# Patient Record
Sex: FEMALE | Race: WHITE | URBAN - METROPOLITAN AREA
[De-identification: names, ages, dates, MRNs, and addresses within clinical notes are randomized per-mention and may not be internally consistent; named-entity substitution may affect disease eponyms.]

---

## 2017-05-12 ENCOUNTER — HISTORICAL (OUTPATIENT)
Dept: OTOLARYNGOLOGY | Facility: CLINIC | Age: 62
End: 2017-05-12

## 2017-05-12 LAB
ALBUMIN SERPL-MCNC: 3.7 GM/DL (ref 3.4–5)
ALBUMIN/GLOB SERPL: 1 RATIO (ref 1–2)
ALP SERPL-CCNC: 36 UNIT/L (ref 20–120)
ALT SERPL-CCNC: 8 UNIT/L
AST SERPL-CCNC: 21 UNIT/L
BILIRUB SERPL-MCNC: 1.1 MG/DL
BILIRUBIN DIRECT+TOT PNL SERPL-MCNC: 0.2 MG/DL
BILIRUBIN DIRECT+TOT PNL SERPL-MCNC: 0.9 MG/DL
BUN SERPL-MCNC: 6 MG/DL (ref 7–25)
BUN SERPL-MCNC: 7 MG/DL (ref 7–25)
CALCIUM SERPL-MCNC: 9 MG/DL (ref 8.4–10.3)
CHLORIDE SERPL-SCNC: 97 MMOL/L (ref 96–110)
CO2 SERPL-SCNC: 31 MMOL/L (ref 24–32)
CREAT SERPL-MCNC: 0.78 MG/DL (ref 0.7–1.1)
CREAT SERPL-MCNC: 0.82 MG/DL (ref 0.7–1.1)
ERYTHROCYTE [DISTWIDTH] IN BLOOD BY AUTOMATED COUNT: 13.3 % (ref 11.5–14.5)
GLOBULIN SER-MCNC: 3 GM/ML (ref 2.3–3.5)
GLUCOSE SERPL-MCNC: 75 MG/DL (ref 65–99)
HCT VFR BLD AUTO: 36.8 % (ref 35–46)
HGB BLD-MCNC: 12.4 GM/DL (ref 12–16)
MCH RBC QN AUTO: 35.4 PG (ref 26–34)
MCHC RBC AUTO-ENTMCNC: 33.7 GM/DL (ref 31–37)
MCV RBC AUTO: 105.1 FL (ref 80–100)
PLATELET # BLD AUTO: 296 X10(3)/MCL (ref 130–400)
PMV BLD AUTO: 10.1 FL (ref 7.4–10.4)
POTASSIUM SERPL-SCNC: 3.2 MMOL/L (ref 3.6–5.2)
PROT SERPL-MCNC: 6.7 GM/DL (ref 6–8)
RBC # BLD AUTO: 3.5 X10(6)/MCL (ref 4–5.2)
SODIUM SERPL-SCNC: 139 MMOL/L (ref 135–146)
WBC # SPEC AUTO: 8.9 X10(3)/MCL (ref 4.5–11)

## 2017-05-15 ENCOUNTER — HISTORICAL (OUTPATIENT)
Dept: SURGERY | Facility: HOSPITAL | Age: 62
End: 2017-05-15

## 2017-05-15 LAB
ETHANOL SERPL-MCNC: <5 MG/DL
POTASSIUM SERPL-SCNC: 3.1 MMOL/L (ref 3.6–5.2)

## 2017-05-30 ENCOUNTER — HISTORICAL (OUTPATIENT)
Dept: ADMINISTRATIVE | Facility: HOSPITAL | Age: 62
End: 2017-05-30

## 2017-05-30 LAB
ALBUMIN SERPL-MCNC: 3.8 GM/DL (ref 3.4–5)
ALBUMIN/GLOB SERPL: 1 RATIO (ref 1–2)
ALP SERPL-CCNC: 56 UNIT/L (ref 20–120)
ALT SERPL-CCNC: 11 UNIT/L
AST SERPL-CCNC: 21 UNIT/L
BILIRUB SERPL-MCNC: 0.6 MG/DL
BILIRUBIN DIRECT+TOT PNL SERPL-MCNC: <0.1 MG/DL
BILIRUBIN DIRECT+TOT PNL SERPL-MCNC: >0.5 MG/DL
BUN SERPL-MCNC: 8 MG/DL (ref 7–25)
CALCIUM SERPL-MCNC: 9.8 MG/DL (ref 8.4–10.3)
CHLORIDE SERPL-SCNC: 98 MMOL/L (ref 96–110)
CO2 SERPL-SCNC: 32 MMOL/L (ref 24–32)
CREAT SERPL-MCNC: 0.73 MG/DL (ref 0.7–1.1)
ERYTHROCYTE [DISTWIDTH] IN BLOOD BY AUTOMATED COUNT: 12.5 % (ref 11.5–14.5)
GLOBULIN SER-MCNC: 3.5 GM/ML (ref 2.3–3.5)
GLUCOSE SERPL-MCNC: 104 MG/DL (ref 65–99)
HCT VFR BLD AUTO: 38.6 % (ref 35–46)
HGB BLD-MCNC: 12.9 GM/DL (ref 12–16)
MCH RBC QN AUTO: 34.3 PG (ref 26–34)
MCHC RBC AUTO-ENTMCNC: 33.4 GM/DL (ref 31–37)
MCV RBC AUTO: 102.7 FL (ref 80–100)
PLATELET # BLD AUTO: 350 X10(3)/MCL (ref 130–400)
PMV BLD AUTO: 9.9 FL (ref 7.4–10.4)
POTASSIUM SERPL-SCNC: 3 MMOL/L (ref 3.6–5.2)
PROT SERPL-MCNC: 7.3 GM/DL (ref 6–8)
RBC # BLD AUTO: 3.76 X10(6)/MCL (ref 4–5.2)
SODIUM SERPL-SCNC: 139 MMOL/L (ref 135–146)
WBC # SPEC AUTO: 9.7 X10(3)/MCL (ref 4.5–11)

## 2017-05-31 ENCOUNTER — HISTORICAL (OUTPATIENT)
Dept: RADIATION THERAPY | Facility: HOSPITAL | Age: 62
End: 2017-05-31

## 2017-06-27 ENCOUNTER — HISTORICAL (OUTPATIENT)
Dept: RADIATION THERAPY | Facility: HOSPITAL | Age: 62
End: 2017-06-27

## 2017-06-28 ENCOUNTER — HISTORICAL (OUTPATIENT)
Dept: RADIATION THERAPY | Facility: HOSPITAL | Age: 62
End: 2017-06-28

## 2017-07-05 ENCOUNTER — HISTORICAL (OUTPATIENT)
Dept: SPEECH THERAPY | Facility: HOSPITAL | Age: 62
End: 2017-07-05

## 2017-07-06 ENCOUNTER — HISTORICAL (OUTPATIENT)
Dept: RADIATION THERAPY | Facility: HOSPITAL | Age: 62
End: 2017-07-06

## 2017-07-07 ENCOUNTER — HISTORICAL (OUTPATIENT)
Dept: RADIATION THERAPY | Facility: HOSPITAL | Age: 62
End: 2017-07-07

## 2017-07-10 ENCOUNTER — HISTORICAL (OUTPATIENT)
Dept: INFUSION THERAPY | Facility: HOSPITAL | Age: 62
End: 2017-07-10

## 2017-07-10 LAB
ABS NEUT (OLG): 4.88 X10(3)/MCL (ref 2.1–9.2)
ALBUMIN SERPL-MCNC: 3.4 GM/DL (ref 3.4–5)
ALBUMIN/GLOB SERPL: 1 RATIO (ref 1–2)
ALP SERPL-CCNC: 85 UNIT/L (ref 45–117)
ALT SERPL-CCNC: 19 UNIT/L (ref 12–78)
AST SERPL-CCNC: 20 UNIT/L (ref 15–37)
BASOPHILS # BLD AUTO: 0.06 X10(3)/MCL
BASOPHILS NFR BLD AUTO: 1 % (ref 0–1)
BILIRUB SERPL-MCNC: 0.2 MG/DL (ref 0.2–1)
BILIRUBIN DIRECT+TOT PNL SERPL-MCNC: <0.1 MG/DL
BILIRUBIN DIRECT+TOT PNL SERPL-MCNC: >0.1 MG/DL
BUN SERPL-MCNC: 9 MG/DL (ref 7–18)
CALCIUM SERPL-MCNC: 9 MG/DL (ref 8.5–10.1)
CHLORIDE SERPL-SCNC: 102 MMOL/L (ref 98–107)
CO2 SERPL-SCNC: 32 MMOL/L (ref 21–32)
CREAT SERPL-MCNC: 0.6 MG/DL (ref 0.6–1.3)
EOSINOPHIL # BLD AUTO: 0.24 X10(3)/MCL
EOSINOPHIL NFR BLD AUTO: 3 % (ref 0–5)
ERYTHROCYTE [DISTWIDTH] IN BLOOD BY AUTOMATED COUNT: 12.5 % (ref 11.5–14.5)
GLOBULIN SER-MCNC: 3.8 GM/ML (ref 2.3–3.5)
GLUCOSE SERPL-MCNC: 84 MG/DL (ref 74–106)
HCT VFR BLD AUTO: 27.9 % (ref 35–46)
HGB BLD-MCNC: 8.9 GM/DL (ref 12–16)
IMM GRANULOCYTES # BLD AUTO: 0.05 10*3/UL
IMM GRANULOCYTES NFR BLD AUTO: 1 %
LYMPHOCYTES # BLD AUTO: 1.36 X10(3)/MCL
LYMPHOCYTES NFR BLD AUTO: 18 % (ref 15–40)
MCH RBC QN AUTO: 31.8 PG (ref 26–34)
MCHC RBC AUTO-ENTMCNC: 31.9 GM/DL (ref 31–37)
MCV RBC AUTO: 99.6 FL (ref 80–100)
MONOCYTES # BLD AUTO: 0.92 X10(3)/MCL
MONOCYTES NFR BLD AUTO: 12 % (ref 4–12)
NEUTROPHILS # BLD AUTO: 4.88 X10(3)/MCL
NEUTROPHILS NFR BLD AUTO: 65 X10(3)/MCL
PLATELET # BLD AUTO: 460 X10(3)/MCL (ref 130–400)
PMV BLD AUTO: 9.3 FL (ref 7.4–10.4)
POTASSIUM SERPL-SCNC: 4.3 MMOL/L (ref 3.5–5.1)
PROT SERPL-MCNC: 7.2 GM/DL (ref 6.4–8.2)
RBC # BLD AUTO: 2.8 X10(6)/MCL (ref 4–5.2)
SODIUM SERPL-SCNC: 142 MMOL/L (ref 136–145)
WBC # SPEC AUTO: 7.5 X10(3)/MCL (ref 4.5–11)

## 2017-07-11 ENCOUNTER — HISTORICAL (OUTPATIENT)
Dept: RADIATION THERAPY | Facility: HOSPITAL | Age: 62
End: 2017-07-11

## 2017-07-12 ENCOUNTER — HISTORICAL (OUTPATIENT)
Dept: INFUSION THERAPY | Facility: HOSPITAL | Age: 62
End: 2017-07-12

## 2017-07-12 ENCOUNTER — HISTORICAL (OUTPATIENT)
Dept: RADIATION THERAPY | Facility: HOSPITAL | Age: 62
End: 2017-07-12

## 2017-07-13 ENCOUNTER — HISTORICAL (OUTPATIENT)
Dept: RADIATION THERAPY | Facility: HOSPITAL | Age: 62
End: 2017-07-13

## 2017-07-14 ENCOUNTER — HISTORICAL (OUTPATIENT)
Dept: RADIATION THERAPY | Facility: HOSPITAL | Age: 62
End: 2017-07-14

## 2017-07-17 ENCOUNTER — HISTORICAL (OUTPATIENT)
Dept: RADIATION THERAPY | Facility: HOSPITAL | Age: 62
End: 2017-07-17

## 2017-07-18 ENCOUNTER — HISTORICAL (OUTPATIENT)
Dept: RADIATION THERAPY | Facility: HOSPITAL | Age: 62
End: 2017-07-18

## 2017-07-19 ENCOUNTER — HISTORICAL (OUTPATIENT)
Dept: ADMINISTRATIVE | Facility: HOSPITAL | Age: 62
End: 2017-07-19

## 2017-07-19 ENCOUNTER — HISTORICAL (OUTPATIENT)
Dept: RADIATION THERAPY | Facility: HOSPITAL | Age: 62
End: 2017-07-19

## 2017-07-19 LAB
ABS NEUT (OLG): 3.53 X10(3)/MCL (ref 2.1–9.2)
ALBUMIN SERPL-MCNC: 3.6 GM/DL (ref 3.4–5)
ALBUMIN/GLOB SERPL: 1 RATIO (ref 1–2)
ALP SERPL-CCNC: 84 UNIT/L (ref 45–117)
ALT SERPL-CCNC: 41 UNIT/L (ref 12–78)
AST SERPL-CCNC: 32 UNIT/L (ref 15–37)
BASOPHILS # BLD AUTO: 0.04 X10(3)/MCL
BASOPHILS NFR BLD AUTO: 1 % (ref 0–1)
BILIRUB SERPL-MCNC: 0.3 MG/DL (ref 0.2–1)
BILIRUBIN DIRECT+TOT PNL SERPL-MCNC: <0.1 MG/DL
BILIRUBIN DIRECT+TOT PNL SERPL-MCNC: >0.2 MG/DL
BUN SERPL-MCNC: 19 MG/DL (ref 7–18)
CALCIUM SERPL-MCNC: 9.2 MG/DL (ref 8.5–10.1)
CHLORIDE SERPL-SCNC: 95 MMOL/L (ref 98–107)
CO2 SERPL-SCNC: 33 MMOL/L (ref 21–32)
CREAT SERPL-MCNC: 1.2 MG/DL (ref 0.6–1.3)
EOSINOPHIL # BLD AUTO: 0.09 X10(3)/MCL
EOSINOPHIL NFR BLD AUTO: 2 % (ref 0–5)
ERYTHROCYTE [DISTWIDTH] IN BLOOD BY AUTOMATED COUNT: 12.6 % (ref 11.5–14.5)
GLOBULIN SER-MCNC: 3.7 GM/ML (ref 2.3–3.5)
GLUCOSE SERPL-MCNC: 69 MG/DL (ref 74–106)
HCT VFR BLD AUTO: 25.9 % (ref 35–46)
HGB BLD-MCNC: 8.6 GM/DL (ref 12–16)
IMM GRANULOCYTES # BLD AUTO: 0.01 10*3/UL
IMM GRANULOCYTES NFR BLD AUTO: 0 %
LYMPHOCYTES # BLD AUTO: 0.56 X10(3)/MCL
LYMPHOCYTES NFR BLD AUTO: 12 % (ref 15–40)
MAGNESIUM SERPL-MCNC: 2 MG/DL (ref 1.8–2.4)
MCH RBC QN AUTO: 31 PG (ref 26–34)
MCHC RBC AUTO-ENTMCNC: 33.2 GM/DL (ref 31–37)
MCV RBC AUTO: 93.5 FL (ref 80–100)
MONOCYTES # BLD AUTO: 0.64 X10(3)/MCL
MONOCYTES NFR BLD AUTO: 13 % (ref 4–12)
NEUTROPHILS # BLD AUTO: 3.53 X10(3)/MCL
NEUTROPHILS NFR BLD AUTO: 73 X10(3)/MCL
PLATELET # BLD AUTO: 361 X10(3)/MCL (ref 130–400)
PMV BLD AUTO: 9.4 FL (ref 7.4–10.4)
POTASSIUM SERPL-SCNC: 4.2 MMOL/L (ref 3.5–5.1)
PROT SERPL-MCNC: 7.3 GM/DL (ref 6.4–8.2)
RBC # BLD AUTO: 2.77 X10(6)/MCL (ref 4–5.2)
SODIUM SERPL-SCNC: 135 MMOL/L (ref 136–145)
WBC # SPEC AUTO: 4.9 X10(3)/MCL (ref 4.5–11)

## 2017-07-20 ENCOUNTER — HISTORICAL (OUTPATIENT)
Dept: RADIATION THERAPY | Facility: HOSPITAL | Age: 62
End: 2017-07-20

## 2017-07-21 ENCOUNTER — HISTORICAL (OUTPATIENT)
Dept: RADIATION THERAPY | Facility: HOSPITAL | Age: 62
End: 2017-07-21

## 2017-07-24 ENCOUNTER — HISTORICAL (OUTPATIENT)
Dept: RADIATION THERAPY | Facility: HOSPITAL | Age: 62
End: 2017-07-24

## 2017-07-25 ENCOUNTER — HISTORICAL (OUTPATIENT)
Dept: RADIATION THERAPY | Facility: HOSPITAL | Age: 62
End: 2017-07-25

## 2017-07-26 ENCOUNTER — HISTORICAL (OUTPATIENT)
Dept: RADIATION THERAPY | Facility: HOSPITAL | Age: 62
End: 2017-07-26

## 2017-07-27 ENCOUNTER — HISTORICAL (OUTPATIENT)
Dept: RADIATION THERAPY | Facility: HOSPITAL | Age: 62
End: 2017-07-27

## 2017-07-28 ENCOUNTER — HISTORICAL (OUTPATIENT)
Dept: RADIATION THERAPY | Facility: HOSPITAL | Age: 62
End: 2017-07-28

## 2017-07-31 ENCOUNTER — HISTORICAL (OUTPATIENT)
Dept: RADIATION THERAPY | Facility: HOSPITAL | Age: 62
End: 2017-07-31

## 2017-08-01 ENCOUNTER — HISTORICAL (OUTPATIENT)
Dept: RADIATION THERAPY | Facility: HOSPITAL | Age: 62
End: 2017-08-01

## 2017-08-02 ENCOUNTER — HISTORICAL (OUTPATIENT)
Dept: RADIATION THERAPY | Facility: HOSPITAL | Age: 62
End: 2017-08-02

## 2017-08-02 ENCOUNTER — HISTORICAL (OUTPATIENT)
Dept: INFUSION THERAPY | Facility: HOSPITAL | Age: 62
End: 2017-08-02

## 2017-08-02 LAB
ABS NEUT (OLG): 1.56 X10(3)/MCL
ALBUMIN SERPL-MCNC: 3.5 GM/DL (ref 3.4–5)
ALBUMIN/GLOB SERPL: 1 RATIO (ref 1–2)
ALP SERPL-CCNC: 96 UNIT/L (ref 45–117)
ALT SERPL-CCNC: 29 UNIT/L (ref 12–78)
ANISOCYTOSIS BLD QL SMEAR: ABNORMAL
AST SERPL-CCNC: 31 UNIT/L (ref 15–37)
BASOPHILS NFR BLD MANUAL: 0 %
BILIRUB SERPL-MCNC: 0.2 MG/DL (ref 0.2–1)
BILIRUBIN DIRECT+TOT PNL SERPL-MCNC: <0.1 MG/DL
BILIRUBIN DIRECT+TOT PNL SERPL-MCNC: >0.1 MG/DL
BUN SERPL-MCNC: 12 MG/DL (ref 7–18)
CALCIUM SERPL-MCNC: 9.3 MG/DL (ref 8.5–10.1)
CHLORIDE SERPL-SCNC: 102 MMOL/L (ref 98–107)
CO2 SERPL-SCNC: 31 MMOL/L (ref 21–32)
CREAT SERPL-MCNC: 0.9 MG/DL (ref 0.6–1.3)
EOSINOPHIL NFR BLD MANUAL: 2 %
ERYTHROCYTE [DISTWIDTH] IN BLOOD BY AUTOMATED COUNT: 14.1 % (ref 11.5–14.5)
GLOBULIN SER-MCNC: 4.1 GM/ML (ref 2.3–3.5)
GLUCOSE SERPL-MCNC: 87 MG/DL (ref 74–106)
GRANULOCYTES NFR BLD MANUAL: 47 % (ref 43–75)
HCT VFR BLD AUTO: 29.2 % (ref 35–46)
HGB BLD-MCNC: 9.3 GM/DL (ref 12–16)
HYPOCHROMIA BLD QL SMEAR: ABNORMAL
LYMPHOCYTES NFR BLD MANUAL: 17 % (ref 20.5–51.1)
MAGNESIUM SERPL-MCNC: 2.1 MG/DL (ref 1.8–2.4)
MCH RBC QN AUTO: 30.5 PG (ref 26–34)
MCHC RBC AUTO-ENTMCNC: 31.8 GM/DL (ref 31–37)
MCV RBC AUTO: 95.7 FL (ref 80–100)
MONOCYTES NFR BLD MANUAL: 11 % (ref 2–9)
NEUTS BAND NFR BLD MANUAL: 23 % (ref 0–10)
PLATELET # BLD AUTO: 588 X10(3)/MCL (ref 130–400)
PLATELET # BLD EST: ABNORMAL 10*3/UL
PMV BLD AUTO: 9 FL (ref 7.4–10.4)
POTASSIUM SERPL-SCNC: 4.7 MMOL/L (ref 3.5–5.1)
PROT SERPL-MCNC: 7.6 GM/DL (ref 6.4–8.2)
RBC # BLD AUTO: 3.05 X10(6)/MCL (ref 4–5.2)
RBC MORPH BLD: ABNORMAL
SODIUM SERPL-SCNC: 139 MMOL/L (ref 136–145)
WBC # SPEC AUTO: 3.2 X10(3)/MCL (ref 4.5–11)

## 2017-08-03 ENCOUNTER — HISTORICAL (OUTPATIENT)
Dept: RADIATION THERAPY | Facility: HOSPITAL | Age: 62
End: 2017-08-03

## 2017-08-04 ENCOUNTER — HISTORICAL (OUTPATIENT)
Dept: RADIATION THERAPY | Facility: HOSPITAL | Age: 62
End: 2017-08-04

## 2017-08-07 ENCOUNTER — HISTORICAL (OUTPATIENT)
Dept: RADIATION THERAPY | Facility: HOSPITAL | Age: 62
End: 2017-08-07

## 2017-08-08 ENCOUNTER — HISTORICAL (OUTPATIENT)
Dept: RADIATION THERAPY | Facility: HOSPITAL | Age: 62
End: 2017-08-08

## 2017-08-08 ENCOUNTER — HISTORICAL (OUTPATIENT)
Dept: SPEECH THERAPY | Facility: HOSPITAL | Age: 62
End: 2017-08-08

## 2017-08-08 ENCOUNTER — HISTORICAL (OUTPATIENT)
Dept: ADMINISTRATIVE | Facility: HOSPITAL | Age: 62
End: 2017-08-08

## 2017-08-08 LAB
ABS NEUT (OLG): 5.25 X10(3)/MCL (ref 2.1–9.2)
ALBUMIN SERPL-MCNC: 3.6 GM/DL (ref 3.4–5)
ALBUMIN/GLOB SERPL: 1 RATIO (ref 1–2)
ALP SERPL-CCNC: 81 UNIT/L (ref 45–117)
ALT SERPL-CCNC: 45 UNIT/L (ref 12–78)
APPEARANCE, UA: CLEAR
AST SERPL-CCNC: 27 UNIT/L (ref 15–37)
BACTERIA #/AREA URNS AUTO: ABNORMAL /[HPF]
BASOPHILS # BLD AUTO: 0.07 X10(3)/MCL
BASOPHILS NFR BLD AUTO: 1 % (ref 0–1)
BILIRUB SERPL-MCNC: 0.4 MG/DL (ref 0.2–1)
BILIRUB UR QL STRIP: NEGATIVE
BILIRUBIN DIRECT+TOT PNL SERPL-MCNC: <0.1 MG/DL
BILIRUBIN DIRECT+TOT PNL SERPL-MCNC: >0.3 MG/DL
BUN SERPL-MCNC: 13 MG/DL (ref 7–18)
CALCIUM SERPL-MCNC: 9.1 MG/DL (ref 8.5–10.1)
CHLORIDE SERPL-SCNC: 99 MMOL/L (ref 98–107)
CO2 SERPL-SCNC: 29 MMOL/L (ref 21–32)
COLOR UR: YELLOW
CREAT SERPL-MCNC: 0.9 MG/DL (ref 0.6–1.3)
EOSINOPHIL # BLD AUTO: 0.07 X10(3)/MCL
EOSINOPHIL NFR BLD AUTO: 1 % (ref 0–5)
ERYTHROCYTE [DISTWIDTH] IN BLOOD BY AUTOMATED COUNT: 13.8 % (ref 11.5–14.5)
GLOBULIN SER-MCNC: 4.1 GM/ML (ref 2.3–3.5)
GLUCOSE (UA): NORMAL
GLUCOSE SERPL-MCNC: 95 MG/DL (ref 74–106)
HCT VFR BLD AUTO: 30.1 % (ref 35–46)
HGB BLD-MCNC: 10 GM/DL (ref 12–16)
HGB UR QL STRIP: NEGATIVE
HYALINE CASTS #/AREA URNS LPF: ABNORMAL /[LPF]
IMM GRANULOCYTES # BLD AUTO: 0.04 10*3/UL
IMM GRANULOCYTES NFR BLD AUTO: 1 %
KETONES UR QL STRIP: ABNORMAL
LEUKOCYTE ESTERASE UR QL STRIP: NEGATIVE
LYMPHOCYTES # BLD AUTO: 0.58 X10(3)/MCL
LYMPHOCYTES NFR BLD AUTO: 8 % (ref 15–40)
MAGNESIUM SERPL-MCNC: 1.9 MG/DL (ref 1.8–2.4)
MCH RBC QN AUTO: 30.7 PG (ref 26–34)
MCHC RBC AUTO-ENTMCNC: 33.2 GM/DL (ref 31–37)
MCV RBC AUTO: 92.3 FL (ref 80–100)
MONOCYTES # BLD AUTO: 0.91 X10(3)/MCL
MONOCYTES NFR BLD AUTO: 13 % (ref 4–12)
NEUTROPHILS # BLD AUTO: 5.25 X10(3)/MCL
NEUTROPHILS NFR BLD AUTO: 76 X10(3)/MCL
NITRITE UR QL STRIP: NEGATIVE
PH UR STRIP: 7.5 [PH] (ref 4.5–8)
PLATELET # BLD AUTO: 338 X10(3)/MCL (ref 130–400)
PMV BLD AUTO: 9.6 FL (ref 7.4–10.4)
POTASSIUM SERPL-SCNC: 4 MMOL/L (ref 3.5–5.1)
PROT SERPL-MCNC: 7.7 GM/DL (ref 6.4–8.2)
PROT UR QL STRIP: 30 MG/DL
RBC # BLD AUTO: 3.26 X10(6)/MCL (ref 4–5.2)
RBC #/AREA URNS AUTO: ABNORMAL /[HPF]
SODIUM SERPL-SCNC: 135 MMOL/L (ref 136–145)
SP GR UR STRIP: 1.01 (ref 1–1.03)
SQUAMOUS #/AREA URNS LPF: ABNORMAL /[LPF]
UROBILINOGEN UR STRIP-ACNC: NORMAL
WBC # SPEC AUTO: 6.9 X10(3)/MCL (ref 4.5–11)
WBC #/AREA URNS AUTO: ABNORMAL /HPF

## 2017-08-09 ENCOUNTER — HISTORICAL (OUTPATIENT)
Dept: RADIATION THERAPY | Facility: HOSPITAL | Age: 62
End: 2017-08-09

## 2017-08-10 ENCOUNTER — HISTORICAL (OUTPATIENT)
Dept: RADIATION THERAPY | Facility: HOSPITAL | Age: 62
End: 2017-08-10

## 2017-08-10 LAB — FINAL CULTURE: NORMAL

## 2017-08-11 ENCOUNTER — HISTORICAL (OUTPATIENT)
Dept: RADIATION THERAPY | Facility: HOSPITAL | Age: 62
End: 2017-08-11

## 2017-08-14 ENCOUNTER — HISTORICAL (OUTPATIENT)
Dept: RADIATION THERAPY | Facility: HOSPITAL | Age: 62
End: 2017-08-14

## 2017-08-15 ENCOUNTER — HISTORICAL (OUTPATIENT)
Dept: RADIATION THERAPY | Facility: HOSPITAL | Age: 62
End: 2017-08-15

## 2017-08-16 ENCOUNTER — HISTORICAL (OUTPATIENT)
Dept: RADIATION THERAPY | Facility: HOSPITAL | Age: 62
End: 2017-08-16

## 2017-08-17 ENCOUNTER — HISTORICAL (OUTPATIENT)
Dept: RADIATION THERAPY | Facility: HOSPITAL | Age: 62
End: 2017-08-17

## 2017-08-17 ENCOUNTER — HISTORICAL (OUTPATIENT)
Dept: HEMATOLOGY/ONCOLOGY | Facility: CLINIC | Age: 62
End: 2017-08-17

## 2017-08-18 ENCOUNTER — HISTORICAL (OUTPATIENT)
Dept: RADIATION THERAPY | Facility: HOSPITAL | Age: 62
End: 2017-08-18

## 2017-08-21 ENCOUNTER — HISTORICAL (OUTPATIENT)
Dept: RADIATION THERAPY | Facility: HOSPITAL | Age: 62
End: 2017-08-21

## 2017-08-22 ENCOUNTER — HISTORICAL (OUTPATIENT)
Dept: ADMINISTRATIVE | Facility: HOSPITAL | Age: 62
End: 2017-08-22

## 2017-08-22 ENCOUNTER — HISTORICAL (OUTPATIENT)
Dept: RADIATION THERAPY | Facility: HOSPITAL | Age: 62
End: 2017-08-22

## 2017-08-22 LAB
ABS NEUT (OLG): 1.29 X10(3)/MCL
ALBUMIN SERPL-MCNC: 3.3 GM/DL (ref 3.4–5)
ALBUMIN/GLOB SERPL: 1 RATIO (ref 1–2)
ALP SERPL-CCNC: 78 UNIT/L (ref 45–117)
ALT SERPL-CCNC: 24 UNIT/L (ref 12–78)
AST SERPL-CCNC: 20 UNIT/L (ref 15–37)
BASOPHILS NFR BLD MANUAL: 0 %
BILIRUB SERPL-MCNC: 0.2 MG/DL (ref 0.2–1)
BILIRUBIN DIRECT+TOT PNL SERPL-MCNC: <0.1 MG/DL
BILIRUBIN DIRECT+TOT PNL SERPL-MCNC: >0.1 MG/DL
BUN SERPL-MCNC: 7 MG/DL (ref 7–18)
CALCIUM SERPL-MCNC: 9.5 MG/DL (ref 8.5–10.1)
CHLORIDE SERPL-SCNC: 96 MMOL/L (ref 98–107)
CO2 SERPL-SCNC: 29 MMOL/L (ref 21–32)
CREAT SERPL-MCNC: 0.8 MG/DL (ref 0.6–1.3)
EOSINOPHIL NFR BLD MANUAL: 2 %
ERYTHROCYTE [DISTWIDTH] IN BLOOD BY AUTOMATED COUNT: 14.2 % (ref 11.5–14.5)
GLOBULIN SER-MCNC: 4.7 GM/ML (ref 2.3–3.5)
GLUCOSE SERPL-MCNC: 96 MG/DL (ref 74–106)
GRANULOCYTES NFR BLD MANUAL: 32 % (ref 43–75)
HCT VFR BLD AUTO: 31.3 % (ref 35–46)
HGB BLD-MCNC: 10.3 GM/DL (ref 12–16)
LYMPHOCYTES NFR BLD MANUAL: 32 % (ref 20.5–51.1)
MAGNESIUM SERPL-MCNC: 2.2 MG/DL (ref 1.8–2.4)
MCH RBC QN AUTO: 29.8 PG (ref 26–34)
MCHC RBC AUTO-ENTMCNC: 32.9 GM/DL (ref 31–37)
MCV RBC AUTO: 90.5 FL (ref 80–100)
METAMYELOCYTES NFR BLD MANUAL: 1 %
MONOCYTES NFR BLD MANUAL: 28 % (ref 2–9)
NEUTS BAND NFR BLD MANUAL: 5 % (ref 0–10)
PLATELET # BLD AUTO: 380 X10(3)/MCL (ref 130–400)
PLATELET # BLD EST: ADEQUATE 10*3/UL
PMV BLD AUTO: 9.2 FL (ref 7.4–10.4)
POTASSIUM SERPL-SCNC: 3.5 MMOL/L (ref 3.5–5.1)
PROT SERPL-MCNC: 8 GM/DL (ref 6.4–8.2)
RBC # BLD AUTO: 3.46 X10(6)/MCL (ref 4–5.2)
RBC MORPH BLD: NORMAL
SODIUM SERPL-SCNC: 134 MMOL/L (ref 136–145)
WBC # SPEC AUTO: 2.9 X10(3)/MCL (ref 4.5–11)

## 2017-08-23 ENCOUNTER — HISTORICAL (OUTPATIENT)
Dept: INFUSION THERAPY | Facility: HOSPITAL | Age: 62
End: 2017-08-23

## 2017-08-29 ENCOUNTER — HISTORICAL (OUTPATIENT)
Dept: RADIATION THERAPY | Facility: HOSPITAL | Age: 62
End: 2017-08-29

## 2017-08-31 ENCOUNTER — HISTORICAL (OUTPATIENT)
Dept: INFUSION THERAPY | Facility: HOSPITAL | Age: 62
End: 2017-08-31

## 2017-09-12 ENCOUNTER — HISTORICAL (OUTPATIENT)
Dept: SPEECH THERAPY | Facility: HOSPITAL | Age: 62
End: 2017-09-12

## 2017-09-20 ENCOUNTER — HISTORICAL (OUTPATIENT)
Dept: RADIOLOGY | Facility: HOSPITAL | Age: 62
End: 2017-09-20

## 2017-09-22 ENCOUNTER — HISTORICAL (OUTPATIENT)
Dept: ADMINISTRATIVE | Facility: HOSPITAL | Age: 62
End: 2017-09-22

## 2017-09-22 LAB
ABS NEUT (OLG): 3.83 X10(3)/MCL (ref 2.1–9.2)
ALBUMIN SERPL-MCNC: 3.2 GM/DL (ref 3.4–5)
ALBUMIN/GLOB SERPL: 1 RATIO (ref 1–2)
ALP SERPL-CCNC: 142 UNIT/L (ref 45–117)
ALT SERPL-CCNC: 32 UNIT/L (ref 12–78)
AST SERPL-CCNC: 34 UNIT/L (ref 15–37)
BASOPHILS # BLD AUTO: 0.08 X10(3)/MCL
BASOPHILS NFR BLD AUTO: 2 % (ref 0–1)
BILIRUB SERPL-MCNC: 0.2 MG/DL (ref 0.2–1)
BILIRUBIN DIRECT+TOT PNL SERPL-MCNC: <0.1 MG/DL
BILIRUBIN DIRECT+TOT PNL SERPL-MCNC: >0.1 MG/DL
BUN SERPL-MCNC: 7 MG/DL (ref 7–18)
CALCIUM SERPL-MCNC: 9.2 MG/DL (ref 8.5–10.1)
CHLORIDE SERPL-SCNC: 100 MMOL/L (ref 98–107)
CO2 SERPL-SCNC: 30 MMOL/L (ref 21–32)
CREAT SERPL-MCNC: 0.9 MG/DL (ref 0.6–1.3)
EOSINOPHIL # BLD AUTO: 0.11 X10(3)/MCL
EOSINOPHIL NFR BLD AUTO: 2 % (ref 0–5)
ERYTHROCYTE [DISTWIDTH] IN BLOOD BY AUTOMATED COUNT: 15 % (ref 11.5–14.5)
GLOBULIN SER-MCNC: 4.8 GM/ML (ref 2.3–3.5)
GLUCOSE SERPL-MCNC: 88 MG/DL (ref 74–106)
HCT VFR BLD AUTO: 31.5 % (ref 35–46)
HGB BLD-MCNC: 10.3 GM/DL (ref 12–16)
IMM GRANULOCYTES # BLD AUTO: 0.01 10*3/UL
IMM GRANULOCYTES NFR BLD AUTO: 0 %
LYMPHOCYTES # BLD AUTO: 0.59 X10(3)/MCL
LYMPHOCYTES NFR BLD AUTO: 11 % (ref 15–40)
MCH RBC QN AUTO: 29.9 PG (ref 26–34)
MCHC RBC AUTO-ENTMCNC: 32.7 GM/DL (ref 31–37)
MCV RBC AUTO: 91.3 FL (ref 80–100)
MONOCYTES # BLD AUTO: 0.7 X10(3)/MCL
MONOCYTES NFR BLD AUTO: 13 % (ref 4–12)
NEUTROPHILS # BLD AUTO: 3.83 X10(3)/MCL
NEUTROPHILS NFR BLD AUTO: 72 X10(3)/MCL
PLATELET # BLD AUTO: 248 X10(3)/MCL (ref 130–400)
PMV BLD AUTO: 9.1 FL (ref 7.4–10.4)
POTASSIUM SERPL-SCNC: 4.3 MMOL/L (ref 3.5–5.1)
PROT SERPL-MCNC: 8 GM/DL (ref 6.4–8.2)
RBC # BLD AUTO: 3.45 X10(6)/MCL (ref 4–5.2)
SODIUM SERPL-SCNC: 136 MMOL/L (ref 136–145)
WBC # SPEC AUTO: 5.3 X10(3)/MCL (ref 4.5–11)

## 2017-10-10 ENCOUNTER — HISTORICAL (OUTPATIENT)
Dept: SPEECH THERAPY | Facility: HOSPITAL | Age: 62
End: 2017-10-10

## 2017-11-22 ENCOUNTER — HISTORICAL (OUTPATIENT)
Dept: RADIOLOGY | Facility: HOSPITAL | Age: 62
End: 2017-11-22

## 2017-11-27 ENCOUNTER — HISTORICAL (OUTPATIENT)
Dept: ADMINISTRATIVE | Facility: HOSPITAL | Age: 62
End: 2017-11-27

## 2017-11-27 LAB
ABS NEUT (OLG): 6.8 X10(3)/MCL (ref 2.1–9.2)
ALBUMIN SERPL-MCNC: 3.7 GM/DL (ref 3.4–5)
ALBUMIN/GLOB SERPL: 1 RATIO (ref 1–2)
ALP SERPL-CCNC: 67 UNIT/L (ref 45–117)
ALT SERPL-CCNC: 12 UNIT/L (ref 12–78)
AST SERPL-CCNC: 19 UNIT/L (ref 15–37)
BASOPHILS # BLD AUTO: 0.06 X10(3)/MCL
BASOPHILS NFR BLD AUTO: 1 % (ref 0–1)
BILIRUB SERPL-MCNC: 0.4 MG/DL (ref 0.2–1)
BILIRUBIN DIRECT+TOT PNL SERPL-MCNC: 0.1 MG/DL
BILIRUBIN DIRECT+TOT PNL SERPL-MCNC: 0.3 MG/DL
BUN SERPL-MCNC: 10 MG/DL (ref 7–18)
CALCIUM SERPL-MCNC: 8.6 MG/DL (ref 8.5–10.1)
CHLORIDE SERPL-SCNC: 97 MMOL/L (ref 98–107)
CO2 SERPL-SCNC: 30 MMOL/L (ref 21–32)
CREAT SERPL-MCNC: 0.9 MG/DL (ref 0.6–1.3)
EOSINOPHIL # BLD AUTO: 0.17 10*3/UL
EOSINOPHIL NFR BLD AUTO: 2 % (ref 0–5)
ERYTHROCYTE [DISTWIDTH] IN BLOOD BY AUTOMATED COUNT: 14.9 % (ref 11.5–14.5)
GLOBULIN SER-MCNC: 4.1 GM/ML (ref 2.3–3.5)
GLUCOSE SERPL-MCNC: 108 MG/DL (ref 74–106)
HAV IGM SERPL QL IA: NONREACTIVE
HBV CORE IGM SERPL QL IA: NONREACTIVE
HBV SURFACE AG SERPL QL IA: NEGATIVE
HCT VFR BLD AUTO: 22.6 % (ref 35–46)
HCV AB SERPL QL IA: NONREACTIVE
HGB BLD-MCNC: 7.3 GM/DL (ref 12–16)
HIV 1+2 AB+HIV1 P24 AG SERPL QL IA: NONREACTIVE
IMM GRANULOCYTES # BLD AUTO: 0.03 10*3/UL
IMM GRANULOCYTES NFR BLD AUTO: 0 %
LYMPHOCYTES # BLD AUTO: 0.58 X10(3)/MCL
LYMPHOCYTES NFR BLD AUTO: 7 % (ref 15–40)
MAGNESIUM SERPL-MCNC: 2 MG/DL (ref 1.8–2.4)
MCH RBC QN AUTO: 28.9 PG (ref 26–34)
MCHC RBC AUTO-ENTMCNC: 32.3 GM/DL (ref 31–37)
MCV RBC AUTO: 89.3 FL (ref 80–100)
MONOCYTES # BLD AUTO: 0.9 X10(3)/MCL
MONOCYTES NFR BLD AUTO: 10 % (ref 4–12)
NEUTROPHILS # BLD AUTO: 6.8 X10(3)/MCL
NEUTROPHILS NFR BLD AUTO: 80 X10(3)/MCL
PLATELET # BLD AUTO: 310 X10(3)/MCL (ref 130–400)
PMV BLD AUTO: 7.7 FL (ref 7.4–10.4)
POTASSIUM SERPL-SCNC: 4.8 MMOL/L (ref 3.5–5.1)
PROT SERPL-MCNC: 7.8 GM/DL (ref 6.4–8.2)
RBC # BLD AUTO: 2.53 X10(6)/MCL (ref 4–5.2)
SODIUM SERPL-SCNC: 133 MMOL/L (ref 136–145)
T4 FREE SERPL-MCNC: 1 NG/DL (ref 0.76–1.46)
TSH SERPL-ACNC: 4.6 MIU/L (ref 0.36–3.74)
WBC # SPEC AUTO: 8.5 X10(3)/MCL (ref 4.5–11)

## 2017-12-15 ENCOUNTER — HISTORICAL (OUTPATIENT)
Dept: ENDOSCOPY | Facility: HOSPITAL | Age: 62
End: 2017-12-15

## 2017-12-18 ENCOUNTER — HISTORICAL (OUTPATIENT)
Dept: ADMINISTRATIVE | Facility: HOSPITAL | Age: 62
End: 2017-12-18

## 2017-12-18 LAB
ABS NEUT (OLG): 3.82 X10(3)/MCL (ref 2.1–9.2)
ALBUMIN SERPL-MCNC: 3.5 GM/DL (ref 3.4–5)
ALBUMIN/GLOB SERPL: 1 RATIO (ref 1–2)
ALP SERPL-CCNC: 59 UNIT/L (ref 45–117)
ALT SERPL-CCNC: 14 UNIT/L (ref 12–78)
AST SERPL-CCNC: 18 UNIT/L (ref 15–37)
BASOPHILS # BLD AUTO: 0.07 X10(3)/MCL
BASOPHILS NFR BLD AUTO: 1 % (ref 0–1)
BILIRUB SERPL-MCNC: 0.2 MG/DL (ref 0.2–1)
BILIRUBIN DIRECT+TOT PNL SERPL-MCNC: 0.1 MG/DL
BILIRUBIN DIRECT+TOT PNL SERPL-MCNC: 0.1 MG/DL
BUN SERPL-MCNC: 6 MG/DL (ref 7–18)
CALCIUM SERPL-MCNC: 9.2 MG/DL (ref 8.5–10.1)
CHLORIDE SERPL-SCNC: 101 MMOL/L (ref 98–107)
CO2 SERPL-SCNC: 33 MMOL/L (ref 21–32)
CREAT SERPL-MCNC: 0.7 MG/DL (ref 0.6–1.3)
EOSINOPHIL # BLD AUTO: 0.14 10*3/UL
EOSINOPHIL NFR BLD AUTO: 3 % (ref 0–5)
ERYTHROCYTE [DISTWIDTH] IN BLOOD BY AUTOMATED COUNT: 15.8 % (ref 11.5–14.5)
GLOBULIN SER-MCNC: 4.5 GM/ML (ref 2.3–3.5)
GLUCOSE SERPL-MCNC: 84 MG/DL (ref 74–106)
HCT VFR BLD AUTO: 26.1 % (ref 35–46)
HGB BLD-MCNC: 8.2 GM/DL (ref 12–16)
IMM GRANULOCYTES # BLD AUTO: 0.03 10*3/UL
IMM GRANULOCYTES NFR BLD AUTO: 1 %
LYMPHOCYTES # BLD AUTO: 0.68 X10(3)/MCL
LYMPHOCYTES NFR BLD AUTO: 13 % (ref 15–40)
MCH RBC QN AUTO: 27.5 PG (ref 26–34)
MCHC RBC AUTO-ENTMCNC: 31.4 GM/DL (ref 31–37)
MCV RBC AUTO: 87.6 FL (ref 80–100)
MONOCYTES # BLD AUTO: 0.59 X10(3)/MCL
MONOCYTES NFR BLD AUTO: 11 % (ref 4–12)
NEUTROPHILS # BLD AUTO: 3.82 X10(3)/MCL
NEUTROPHILS NFR BLD AUTO: 72 X10(3)/MCL
PLATELET # BLD AUTO: 495 X10(3)/MCL (ref 130–400)
PMV BLD AUTO: 8.2 FL (ref 7.4–10.4)
POTASSIUM SERPL-SCNC: 3.4 MMOL/L (ref 3.5–5.1)
PROT SERPL-MCNC: 8 GM/DL (ref 6.4–8.2)
RBC # BLD AUTO: 2.98 X10(6)/MCL (ref 4–5.2)
SODIUM SERPL-SCNC: 138 MMOL/L (ref 136–145)
WBC # SPEC AUTO: 5.3 X10(3)/MCL (ref 4.5–11)

## 2017-12-21 ENCOUNTER — HISTORICAL (OUTPATIENT)
Dept: ADMINISTRATIVE | Facility: HOSPITAL | Age: 62
End: 2017-12-21

## 2018-01-10 ENCOUNTER — HISTORICAL (OUTPATIENT)
Dept: ADMINISTRATIVE | Facility: HOSPITAL | Age: 63
End: 2018-01-10

## 2018-01-10 LAB
ABS NEUT (OLG): 4.22 X10(3)/MCL (ref 2.1–9.2)
ALBUMIN SERPL-MCNC: 3.1 GM/DL (ref 3.4–5)
ALBUMIN/GLOB SERPL: 0.8 {RATIO}
ALP SERPL-CCNC: 47 UNIT/L (ref 38–126)
ALT SERPL-CCNC: 8 UNIT/L (ref 12–78)
APTT PPP: 28.8 SECOND(S) (ref 24.8–36.9)
AST SERPL-CCNC: 18 UNIT/L (ref 15–37)
BASOPHILS # BLD AUTO: 0 X10(3)/MCL (ref 0–0.2)
BASOPHILS NFR BLD AUTO: 0 %
BILIRUB SERPL-MCNC: 0.3 MG/DL (ref 0.2–1)
BILIRUBIN DIRECT+TOT PNL SERPL-MCNC: 0.1 MG/DL (ref 0–0.2)
BILIRUBIN DIRECT+TOT PNL SERPL-MCNC: 0.2 MG/DL (ref 0–0.8)
BUN SERPL-MCNC: 10 MG/DL (ref 7–18)
CALCIUM SERPL-MCNC: 8.7 MG/DL (ref 8.5–10.1)
CHLORIDE SERPL-SCNC: 100 MMOL/L (ref 98–107)
CO2 SERPL-SCNC: 24 MMOL/L (ref 21–32)
CREAT SERPL-MCNC: 0.77 MG/DL (ref 0.55–1.02)
EOSINOPHIL # BLD AUTO: 0.1 X10(3)/MCL (ref 0–0.9)
EOSINOPHIL NFR BLD AUTO: 1 %
ERYTHROCYTE [DISTWIDTH] IN BLOOD BY AUTOMATED COUNT: 17.8 % (ref 11.5–17)
GLOBULIN SER-MCNC: 3.9 GM/DL (ref 2.4–3.5)
GLUCOSE SERPL-MCNC: 72 MG/DL (ref 74–106)
HCT VFR BLD AUTO: 23.5 % (ref 37–47)
HGB BLD-MCNC: 7.3 GM/DL (ref 12–16)
INR PPP: 1.01 (ref 0–1.27)
LYMPHOCYTES # BLD AUTO: 1 X10(3)/MCL (ref 0.6–4.6)
LYMPHOCYTES NFR BLD AUTO: 17 %
MCH RBC QN AUTO: 25.9 PG (ref 27–31)
MCHC RBC AUTO-ENTMCNC: 31.1 GM/DL (ref 33–36)
MCV RBC AUTO: 83.3 FL (ref 80–94)
MONOCYTES # BLD AUTO: 0.4 X10(3)/MCL (ref 0.1–1.3)
MONOCYTES NFR BLD AUTO: 8 %
NEUTROPHILS # BLD AUTO: 4.22 X10(3)/MCL (ref 1.4–7.9)
NEUTROPHILS NFR BLD AUTO: 73 %
PLATELET # BLD AUTO: 211 X10(3)/MCL (ref 130–400)
PMV BLD AUTO: 8.4 FL (ref 9.4–12.4)
POTASSIUM SERPL-SCNC: 4 MMOL/L (ref 3.5–5.1)
PROT SERPL-MCNC: 7 GM/DL (ref 6.4–8.2)
PROTHROMBIN TIME: 13.6 SECOND(S) (ref 12.2–14.7)
RBC # BLD AUTO: 2.82 X10(6)/MCL (ref 4.2–5.4)
SODIUM SERPL-SCNC: 137 MMOL/L (ref 136–145)
WBC # SPEC AUTO: 5.8 X10(3)/MCL (ref 4.5–11.5)

## 2018-01-11 ENCOUNTER — HISTORICAL (OUTPATIENT)
Dept: ADMINISTRATIVE | Facility: HOSPITAL | Age: 63
End: 2018-01-11

## 2018-01-23 ENCOUNTER — HISTORICAL (OUTPATIENT)
Dept: ADMINISTRATIVE | Facility: HOSPITAL | Age: 63
End: 2018-01-23

## 2018-01-23 LAB
FERRITIN SERPL-MCNC: 11 NG/ML (ref 8–388)
FOLATE SERPL-MCNC: 15.2 NG/ML (ref 3.1–17.5)
HAPTOGLOB SERPL-MCNC: 165 MG/DL (ref 31–200)
IRON SATN MFR SERPL: 6.1 % (ref 15–50)
IRON SERPL-MCNC: 21 MCG/DL (ref 50–170)
LDH SERPL-CCNC: 177 UNIT/L (ref 84–246)
PROT SERPL-MCNC: 7.1 GM/DL
RET# (OHS): 0.04 X10(6)/MCL (ref 0.02–0.08)
RETICULOCYTE COUNT AUTOMATED (OLG): 1.3 % (ref 0.5–1.5)
TIBC SERPL-MCNC: 343 MCG/DL (ref 250–450)
TRANSFERRIN SERPL-MCNC: 273 MG/DL (ref 200–360)
VIT B12 SERPL-MCNC: 646 PG/ML (ref 193–986)

## 2018-01-29 ENCOUNTER — HISTORICAL (OUTPATIENT)
Dept: RADIOLOGY | Facility: HOSPITAL | Age: 63
End: 2018-01-29

## 2018-02-08 ENCOUNTER — HISTORICAL (OUTPATIENT)
Dept: ADMINISTRATIVE | Facility: HOSPITAL | Age: 63
End: 2018-02-08

## 2018-02-12 ENCOUNTER — HISTORICAL (OUTPATIENT)
Dept: INFUSION THERAPY | Facility: HOSPITAL | Age: 63
End: 2018-02-12

## 2018-02-12 LAB
ABS NEUT (OLG): 4.35 X10(3)/MCL (ref 2.1–9.2)
ALBUMIN SERPL-MCNC: 3.2 GM/DL (ref 3.4–5)
ALBUMIN/GLOB SERPL: 1 RATIO (ref 1–2)
ALP SERPL-CCNC: 39 UNIT/L (ref 45–117)
ALT SERPL-CCNC: 8 UNIT/L (ref 12–78)
ANISOCYTOSIS BLD QL SMEAR: ABNORMAL
AST SERPL-CCNC: 17 UNIT/L (ref 15–37)
BASOPHILS NFR BLD MANUAL: 0 %
BILIRUB SERPL-MCNC: 0.3 MG/DL (ref 0.2–1)
BILIRUBIN DIRECT+TOT PNL SERPL-MCNC: 0.1 MG/DL
BILIRUBIN DIRECT+TOT PNL SERPL-MCNC: 0.2 MG/DL
BUN SERPL-MCNC: 7 MG/DL (ref 7–18)
CALCIUM SERPL-MCNC: 9.1 MG/DL (ref 8.5–10.1)
CHLORIDE SERPL-SCNC: 98 MMOL/L (ref 98–107)
CO2 SERPL-SCNC: 30 MMOL/L (ref 21–32)
CREAT SERPL-MCNC: 0.7 MG/DL (ref 0.6–1.3)
EOSINOPHIL NFR BLD MANUAL: 0 %
ERYTHROCYTE [DISTWIDTH] IN BLOOD BY AUTOMATED COUNT: 25.2 % (ref 11.5–14.5)
GLOBULIN SER-MCNC: 3.8 GM/ML (ref 2.3–3.5)
GLUCOSE SERPL-MCNC: 91 MG/DL (ref 74–106)
GRANULOCYTES NFR BLD MANUAL: 82 % (ref 43–75)
HCT VFR BLD AUTO: 22.8 % (ref 35–46)
HGB BLD-MCNC: 7.4 GM/DL (ref 12–16)
HYPOCHROMIA BLD QL SMEAR: ABNORMAL
LYMPHOCYTES NFR BLD MANUAL: 6 % (ref 20.5–51.1)
MACROCYTES BLD QL SMEAR: ABNORMAL
MCH RBC QN AUTO: 28.6 PG (ref 26–34)
MCHC RBC AUTO-ENTMCNC: 32.5 GM/DL (ref 31–37)
MCV RBC AUTO: 88 FL (ref 80–100)
MONOCYTES NFR BLD MANUAL: 8 % (ref 2–9)
NEUTS BAND NFR BLD MANUAL: 4 % (ref 0–10)
PLATELET # BLD AUTO: 245 X10(3)/MCL (ref 130–400)
PLATELET # BLD EST: ADEQUATE 10*3/UL
PMV BLD AUTO: 8.7 FL (ref 7.4–10.4)
POLYCHROMASIA BLD QL SMEAR: ABNORMAL
POTASSIUM SERPL-SCNC: 3.4 MMOL/L (ref 3.5–5.1)
PROT SERPL-MCNC: 7 GM/DL (ref 6.4–8.2)
RBC # BLD AUTO: 2.59 X10(6)/MCL (ref 4–5.2)
RBC MORPH BLD: ABNORMAL
SODIUM SERPL-SCNC: 137 MMOL/L (ref 136–145)
WBC # SPEC AUTO: 5.8 X10(3)/MCL (ref 4.5–11)

## 2018-02-16 ENCOUNTER — HISTORICAL (OUTPATIENT)
Dept: INFUSION THERAPY | Facility: HOSPITAL | Age: 63
End: 2018-02-16

## 2018-02-16 LAB
ABS NEUT (OLG): 4.53 X10(3)/MCL (ref 2.1–9.2)
ALBUMIN SERPL-MCNC: 3.4 GM/DL (ref 3.4–5)
ALBUMIN/GLOB SERPL: 1 RATIO (ref 1–2)
ALP SERPL-CCNC: 36 UNIT/L (ref 45–117)
ALT SERPL-CCNC: 9 UNIT/L (ref 12–78)
AST SERPL-CCNC: 15 UNIT/L (ref 15–37)
BASOPHILS # BLD AUTO: 0.01 X10(3)/MCL
BASOPHILS NFR BLD AUTO: 0 % (ref 0–1)
BILIRUB SERPL-MCNC: 0.8 MG/DL (ref 0.2–1)
BILIRUBIN DIRECT+TOT PNL SERPL-MCNC: 0.3 MG/DL
BILIRUBIN DIRECT+TOT PNL SERPL-MCNC: 0.5 MG/DL
BUN SERPL-MCNC: 9 MG/DL (ref 7–18)
CALCIUM SERPL-MCNC: 9.6 MG/DL (ref 8.5–10.1)
CHLORIDE SERPL-SCNC: 97 MMOL/L (ref 98–107)
CO2 SERPL-SCNC: 31 MMOL/L (ref 21–32)
CREAT SERPL-MCNC: 0.7 MG/DL (ref 0.6–1.3)
CROSSMATCH INTERPRETATION: NORMAL
EOSINOPHIL # BLD AUTO: 0.02 10*3/UL
EOSINOPHIL NFR BLD AUTO: 0 % (ref 0–5)
ERYTHROCYTE [DISTWIDTH] IN BLOOD BY AUTOMATED COUNT: 26.9 % (ref 11.5–14.5)
GLOBULIN SER-MCNC: 4.4 GM/ML (ref 2.3–3.5)
GLUCOSE SERPL-MCNC: 97 MG/DL (ref 74–106)
HCT VFR BLD AUTO: 26.9 % (ref 35–46)
HGB BLD-MCNC: 8.5 GM/DL (ref 12–16)
IMM GRANULOCYTES # BLD AUTO: 0.02 10*3/UL
IMM GRANULOCYTES NFR BLD AUTO: 0 %
LYMPHOCYTES # BLD AUTO: 0.31 X10(3)/MCL
LYMPHOCYTES NFR BLD AUTO: 6 % (ref 15–40)
MCH RBC QN AUTO: 29.1 PG (ref 26–34)
MCHC RBC AUTO-ENTMCNC: 31.6 GM/DL (ref 31–37)
MCV RBC AUTO: 92.1 FL (ref 80–100)
MONOCYTES # BLD AUTO: 0.22 X10(3)/MCL
MONOCYTES NFR BLD AUTO: 4 % (ref 4–12)
NEUTROPHILS # BLD AUTO: 4.53 X10(3)/MCL
NEUTROPHILS NFR BLD AUTO: 89 X10(3)/MCL
PLATELET # BLD AUTO: 207 X10(3)/MCL (ref 130–400)
PMV BLD AUTO: 9.3 FL (ref 7.4–10.4)
POTASSIUM SERPL-SCNC: 4.3 MMOL/L (ref 3.5–5.1)
PRODUCT READY: NORMAL
PROT SERPL-MCNC: 7.8 GM/DL (ref 6.4–8.2)
RBC # BLD AUTO: 2.92 X10(6)/MCL (ref 4–5.2)
SODIUM SERPL-SCNC: 136 MMOL/L (ref 136–145)
TRANSFUSION ORDER: NORMAL
WBC # SPEC AUTO: 5.1 X10(3)/MCL (ref 4.5–11)

## 2018-02-19 ENCOUNTER — HISTORICAL (OUTPATIENT)
Dept: INFUSION THERAPY | Facility: HOSPITAL | Age: 63
End: 2018-02-19

## 2018-02-26 ENCOUNTER — HISTORICAL (OUTPATIENT)
Dept: INFUSION THERAPY | Facility: HOSPITAL | Age: 63
End: 2018-02-26

## 2018-02-26 LAB
ABS NEUT (OLG): 1.37 X10(3)/MCL
ALBUMIN SERPL-MCNC: 3.2 GM/DL (ref 3.4–5)
ALBUMIN/GLOB SERPL: 1 RATIO (ref 1–2)
ALP SERPL-CCNC: 46 UNIT/L (ref 45–117)
ALT SERPL-CCNC: 12 UNIT/L (ref 12–78)
ANISOCYTOSIS BLD QL SMEAR: ABNORMAL
AST SERPL-CCNC: 16 UNIT/L (ref 15–37)
BASOPHILS NFR BLD MANUAL: 1 %
BILIRUB SERPL-MCNC: 0.3 MG/DL (ref 0.2–1)
BILIRUBIN DIRECT+TOT PNL SERPL-MCNC: 0.1 MG/DL
BILIRUBIN DIRECT+TOT PNL SERPL-MCNC: 0.2 MG/DL
BUN SERPL-MCNC: 7 MG/DL (ref 7–18)
CALCIUM SERPL-MCNC: 9.6 MG/DL (ref 8.5–10.1)
CHLORIDE SERPL-SCNC: 93 MMOL/L (ref 98–107)
CO2 SERPL-SCNC: 33 MMOL/L (ref 21–32)
CREAT SERPL-MCNC: 0.6 MG/DL (ref 0.6–1.3)
EOSINOPHIL NFR BLD MANUAL: 0 %
ERYTHROCYTE [DISTWIDTH] IN BLOOD BY AUTOMATED COUNT: 20.8 % (ref 11.5–14.5)
GLOBULIN SER-MCNC: 4.7 GM/ML (ref 2.3–3.5)
GLUCOSE SERPL-MCNC: 133 MG/DL (ref 74–106)
GRANULOCYTES NFR BLD MANUAL: 59 % (ref 43–75)
HCT VFR BLD AUTO: 35.6 % (ref 35–46)
HGB BLD-MCNC: 11.7 GM/DL (ref 12–16)
HYPOCHROMIA BLD QL SMEAR: ABNORMAL
LYMPHOCYTES NFR BLD MANUAL: 8 % (ref 20.5–51.1)
MACROCYTES BLD QL SMEAR: ABNORMAL
MCH RBC QN AUTO: 29.3 PG (ref 26–34)
MCHC RBC AUTO-ENTMCNC: 32.9 GM/DL (ref 31–37)
MCV RBC AUTO: 89.2 FL (ref 80–100)
MICROCYTES BLD QL SMEAR: ABNORMAL
MONOCYTES NFR BLD MANUAL: 9 % (ref 2–9)
NEUTS BAND NFR BLD MANUAL: 23 % (ref 0–10)
PLATELET # BLD AUTO: 240 X10(3)/MCL (ref 130–400)
PLATELET # BLD EST: ADEQUATE 10*3/UL
PMV BLD AUTO: 9.3 FL (ref 7.4–10.4)
POLYCHROMASIA BLD QL SMEAR: ABNORMAL
POTASSIUM SERPL-SCNC: 4 MMOL/L (ref 3.5–5.1)
PROT SERPL-MCNC: 7.9 GM/DL (ref 6.4–8.2)
RBC # BLD AUTO: 3.99 X10(6)/MCL (ref 4–5.2)
RBC MORPH BLD: ABNORMAL
SODIUM SERPL-SCNC: 134 MMOL/L (ref 136–145)
WBC # SPEC AUTO: 2.2 X10(3)/MCL (ref 4.5–11)

## 2018-03-01 ENCOUNTER — HISTORICAL (OUTPATIENT)
Dept: ADMINISTRATIVE | Facility: HOSPITAL | Age: 63
End: 2018-03-01

## 2018-03-01 LAB
ABS NEUT (OLG): 3.35 X10(3)/MCL (ref 2.1–9.2)
ALBUMIN SERPL-MCNC: 3.3 GM/DL (ref 3.4–5)
ALBUMIN/GLOB SERPL: 1 RATIO (ref 1–2)
ALP SERPL-CCNC: 58 UNIT/L (ref 45–117)
ALT SERPL-CCNC: 12 UNIT/L (ref 12–78)
AST SERPL-CCNC: 17 UNIT/L (ref 15–37)
BASOPHILS # BLD AUTO: 0.02 X10(3)/MCL
BASOPHILS NFR BLD AUTO: 0 %
BILIRUB SERPL-MCNC: 0.2 MG/DL (ref 0.2–1)
BILIRUBIN DIRECT+TOT PNL SERPL-MCNC: 0.1 MG/DL
BILIRUBIN DIRECT+TOT PNL SERPL-MCNC: 0.1 MG/DL
BUN SERPL-MCNC: 7 MG/DL (ref 7–18)
CALCIUM SERPL-MCNC: 10.1 MG/DL (ref 8.5–10.1)
CHLORIDE SERPL-SCNC: 95 MMOL/L (ref 98–107)
CO2 SERPL-SCNC: 34 MMOL/L (ref 21–32)
CREAT SERPL-MCNC: 0.6 MG/DL (ref 0.6–1.3)
EOSINOPHIL # BLD AUTO: 0.01 10*3/UL
EOSINOPHIL NFR BLD AUTO: 0 %
ERYTHROCYTE [DISTWIDTH] IN BLOOD BY AUTOMATED COUNT: 21.2 % (ref 11.5–14.5)
GLOBULIN SER-MCNC: 4.5 GM/ML (ref 2.3–3.5)
GLUCOSE SERPL-MCNC: 126 MG/DL (ref 74–106)
HCT VFR BLD AUTO: 34.6 % (ref 35–46)
HGB BLD-MCNC: 11.5 GM/DL (ref 12–16)
IMM GRANULOCYTES # BLD AUTO: 0.04 10*3/UL
IMM GRANULOCYTES NFR BLD AUTO: 1 %
LYMPHOCYTES # BLD AUTO: 0.29 X10(3)/MCL
LYMPHOCYTES NFR BLD AUTO: 7 % (ref 13–40)
MCH RBC QN AUTO: 29.9 PG (ref 26–34)
MCHC RBC AUTO-ENTMCNC: 33.2 GM/DL (ref 31–37)
MCV RBC AUTO: 90.1 FL (ref 80–100)
MONOCYTES # BLD AUTO: 0.3 X10(3)/MCL
MONOCYTES NFR BLD AUTO: 8 % (ref 4–12)
NEUTROPHILS # BLD AUTO: 3.35 X10(3)/MCL
NEUTROPHILS NFR BLD AUTO: 84 X10(3)/MCL
PLATELET # BLD AUTO: 283 X10(3)/MCL (ref 130–400)
PMV BLD AUTO: 9.1 FL (ref 7.4–10.4)
POTASSIUM SERPL-SCNC: 3.4 MMOL/L (ref 3.5–5.1)
PROT SERPL-MCNC: 7.8 GM/DL (ref 6.4–8.2)
RBC # BLD AUTO: 3.84 X10(6)/MCL (ref 4–5.2)
SODIUM SERPL-SCNC: 136 MMOL/L (ref 136–145)
WBC # SPEC AUTO: 4 X10(3)/MCL (ref 4.5–11)

## 2018-03-05 ENCOUNTER — HISTORICAL (OUTPATIENT)
Dept: INFUSION THERAPY | Facility: HOSPITAL | Age: 63
End: 2018-03-05

## 2018-03-05 LAB
ABS NEUT (OLG): 3.5 X10(3)/MCL (ref 2.1–9.2)
ALBUMIN SERPL-MCNC: 3.2 GM/DL (ref 3.4–5)
ALBUMIN/GLOB SERPL: 1 RATIO (ref 1–2)
ALP SERPL-CCNC: 51 UNIT/L (ref 45–117)
ALT SERPL-CCNC: 11 UNIT/L (ref 12–78)
AST SERPL-CCNC: 21 UNIT/L (ref 15–37)
BASOPHILS # BLD AUTO: 0.03 X10(3)/MCL
BASOPHILS NFR BLD AUTO: 1 %
BILIRUB SERPL-MCNC: 0.2 MG/DL (ref 0.2–1)
BILIRUBIN DIRECT+TOT PNL SERPL-MCNC: <0.1 MG/DL
BILIRUBIN DIRECT+TOT PNL SERPL-MCNC: ABNORMAL MG/DL
BUN SERPL-MCNC: 7 MG/DL (ref 7–18)
CALCIUM SERPL-MCNC: 9.6 MG/DL (ref 8.5–10.1)
CHLORIDE SERPL-SCNC: 95 MMOL/L (ref 98–107)
CO2 SERPL-SCNC: 34 MMOL/L (ref 21–32)
CREAT SERPL-MCNC: 0.7 MG/DL (ref 0.6–1.3)
EOSINOPHIL # BLD AUTO: 0.01 X10(3)/MCL
EOSINOPHIL NFR BLD AUTO: 0 %
ERYTHROCYTE [DISTWIDTH] IN BLOOD BY AUTOMATED COUNT: 21.2 % (ref 11.5–14.5)
GLOBULIN SER-MCNC: 4.5 GM/ML (ref 2.3–3.5)
GLUCOSE SERPL-MCNC: 136 MG/DL (ref 74–106)
HCT VFR BLD AUTO: 32.7 % (ref 35–46)
HGB BLD-MCNC: 10.6 GM/DL (ref 12–16)
IMM GRANULOCYTES # BLD AUTO: 0.03 10*3/UL
IMM GRANULOCYTES NFR BLD AUTO: 1 %
LYMPHOCYTES # BLD AUTO: 0.45 X10(3)/MCL
LYMPHOCYTES NFR BLD AUTO: 10 % (ref 13–40)
MCH RBC QN AUTO: 29.4 PG (ref 26–34)
MCHC RBC AUTO-ENTMCNC: 32.4 GM/DL (ref 31–37)
MCV RBC AUTO: 90.6 FL (ref 80–100)
MONOCYTES # BLD AUTO: 0.4 X10(3)/MCL
MONOCYTES NFR BLD AUTO: 9 % (ref 4–12)
NEUTROPHILS # BLD AUTO: 3.5 X10(3)/MCL
NEUTROPHILS NFR BLD AUTO: 79 X10(3)/MCL
PLATELET # BLD AUTO: 249 X10(3)/MCL (ref 130–400)
PMV BLD AUTO: 9.2 FL (ref 7.4–10.4)
POTASSIUM SERPL-SCNC: 4.1 MMOL/L (ref 3.5–5.1)
PROT SERPL-MCNC: 7.7 GM/DL (ref 6.4–8.2)
RBC # BLD AUTO: 3.61 X10(6)/MCL (ref 4–5.2)
SODIUM SERPL-SCNC: 136 MMOL/L (ref 136–145)
WBC # SPEC AUTO: 4.4 X10(3)/MCL (ref 4.5–11)

## 2018-03-12 ENCOUNTER — HISTORICAL (OUTPATIENT)
Dept: INFUSION THERAPY | Facility: HOSPITAL | Age: 63
End: 2018-03-12

## 2018-03-12 LAB
ABS NEUT (OLG): 2.28 X10(3)/MCL (ref 2.1–9.2)
ALBUMIN SERPL-MCNC: 3.1 GM/DL (ref 3.4–5)
ALBUMIN/GLOB SERPL: 1 RATIO (ref 1–2)
ALP SERPL-CCNC: 48 UNIT/L (ref 45–117)
ALT SERPL-CCNC: 10 UNIT/L (ref 12–78)
AST SERPL-CCNC: 14 UNIT/L (ref 15–37)
BASOPHILS # BLD AUTO: 0.03 X10(3)/MCL
BASOPHILS NFR BLD AUTO: 1 %
BILIRUB SERPL-MCNC: 0.2 MG/DL (ref 0.2–1)
BILIRUBIN DIRECT+TOT PNL SERPL-MCNC: <0.1 MG/DL
BILIRUBIN DIRECT+TOT PNL SERPL-MCNC: ABNORMAL MG/DL
BUN SERPL-MCNC: 7 MG/DL (ref 7–18)
CALCIUM SERPL-MCNC: 9 MG/DL (ref 8.5–10.1)
CHLORIDE SERPL-SCNC: 98 MMOL/L (ref 98–107)
CO2 SERPL-SCNC: 32 MMOL/L (ref 21–32)
CREAT SERPL-MCNC: 0.6 MG/DL (ref 0.6–1.3)
ERYTHROCYTE [DISTWIDTH] IN BLOOD BY AUTOMATED COUNT: 21.3 % (ref 11.5–14.5)
GLOBULIN SER-MCNC: 4 GM/ML (ref 2.3–3.5)
GLUCOSE SERPL-MCNC: 132 MG/DL (ref 74–106)
HCT VFR BLD AUTO: 30 % (ref 35–46)
HGB BLD-MCNC: 9.8 GM/DL (ref 12–16)
IMM GRANULOCYTES # BLD AUTO: 0.01 10*3/UL
IMM GRANULOCYTES NFR BLD AUTO: 0 %
LYMPHOCYTES # BLD AUTO: 0.37 X10(3)/MCL
LYMPHOCYTES NFR BLD AUTO: 12 % (ref 13–40)
MCH RBC QN AUTO: 30 PG (ref 26–34)
MCHC RBC AUTO-ENTMCNC: 32.7 GM/DL (ref 31–37)
MCV RBC AUTO: 91.7 FL (ref 80–100)
MONOCYTES # BLD AUTO: 0.4 X10(3)/MCL
MONOCYTES NFR BLD AUTO: 13 % (ref 4–12)
NEUTROPHILS # BLD AUTO: 2.28 X10(3)/MCL
NEUTROPHILS NFR BLD AUTO: 74 X10(3)/MCL
PLATELET # BLD AUTO: 183 X10(3)/MCL (ref 130–400)
PMV BLD AUTO: 9.4 FL (ref 7.4–10.4)
POTASSIUM SERPL-SCNC: 3.7 MMOL/L (ref 3.5–5.1)
PROT SERPL-MCNC: 7.1 GM/DL (ref 6.4–8.2)
RBC # BLD AUTO: 3.27 X10(6)/MCL (ref 4–5.2)
SODIUM SERPL-SCNC: 136 MMOL/L (ref 136–145)
WBC # SPEC AUTO: 3.1 X10(3)/MCL (ref 4.5–11)

## 2018-03-22 ENCOUNTER — HISTORICAL (OUTPATIENT)
Dept: ADMINISTRATIVE | Facility: HOSPITAL | Age: 63
End: 2018-03-22

## 2018-03-22 LAB
ABS NEUT (OLG): 1.83 X10(3)/MCL
ALBUMIN SERPL-MCNC: 3.4 GM/DL (ref 3.4–5)
ALBUMIN/GLOB SERPL: 1 RATIO (ref 1–2)
ALP SERPL-CCNC: 49 UNIT/L (ref 45–117)
ALT SERPL-CCNC: 10 UNIT/L (ref 12–78)
ANISOCYTOSIS BLD QL SMEAR: NORMAL
AST SERPL-CCNC: 16 UNIT/L (ref 15–37)
BASOPHILS # BLD AUTO: 0.02 X10(3)/MCL
BASOPHILS NFR BLD AUTO: 1 %
BILIRUB SERPL-MCNC: 0.3 MG/DL (ref 0.2–1)
BILIRUBIN DIRECT+TOT PNL SERPL-MCNC: 0.1 MG/DL
BILIRUBIN DIRECT+TOT PNL SERPL-MCNC: 0.2 MG/DL
BUN SERPL-MCNC: 4 MG/DL (ref 7–18)
CALCIUM SERPL-MCNC: 9.2 MG/DL (ref 8.5–10.1)
CHLORIDE SERPL-SCNC: 96 MMOL/L (ref 98–107)
CO2 SERPL-SCNC: 31 MMOL/L (ref 21–32)
CREAT SERPL-MCNC: 0.6 MG/DL (ref 0.6–1.3)
ERYTHROCYTE [DISTWIDTH] IN BLOOD BY AUTOMATED COUNT: 22.5 % (ref 11.5–14.5)
FERRITIN SERPL-MCNC: 978.5 NG/ML (ref 8–388)
GLOBULIN SER-MCNC: 4 GM/ML (ref 2.3–3.5)
GLUCOSE SERPL-MCNC: 94 MG/DL (ref 74–106)
HCT VFR BLD AUTO: 29.2 % (ref 35–46)
HGB BLD-MCNC: 9.4 GM/DL (ref 12–16)
HYPOCHROMIA BLD QL SMEAR: NORMAL
IMM GRANULOCYTES # BLD AUTO: 0.01 10*3/UL
IMM GRANULOCYTES NFR BLD AUTO: 0 %
IRON SATN MFR SERPL: 37.5 % (ref 15–50)
IRON SERPL-MCNC: 84 MCG/DL (ref 50–170)
LYMPHOCYTES # BLD AUTO: 0.34 X10(3)/MCL
LYMPHOCYTES NFR BLD AUTO: 14 % (ref 13–40)
MACROCYTES BLD QL SMEAR: NORMAL
MCH RBC QN AUTO: 29.7 PG (ref 26–34)
MCHC RBC AUTO-ENTMCNC: 32.2 GM/DL (ref 31–37)
MCV RBC AUTO: 92.4 FL (ref 80–100)
MICROCYTES BLD QL SMEAR: NORMAL
MONOCYTES # BLD AUTO: 0.29 X10(3)/MCL
MONOCYTES NFR BLD AUTO: 12 % (ref 4–12)
NEUTROPHILS # BLD AUTO: 1.83 X10(3)/MCL
NEUTROPHILS NFR BLD AUTO: 74 %
PLATELET # BLD AUTO: 159 X10(3)/MCL (ref 130–400)
PLATELET # BLD EST: ADEQUATE 10*3/UL
PMV BLD AUTO: 9.7 FL (ref 7.4–10.4)
POLYCHROMASIA BLD QL SMEAR: NORMAL
POTASSIUM SERPL-SCNC: 3.7 MMOL/L (ref 3.5–5.1)
PROT SERPL-MCNC: 7.4 GM/DL (ref 6.4–8.2)
RBC # BLD AUTO: 3.16 X10(6)/MCL (ref 4–5.2)
RBC MORPH BLD: NORMAL
SODIUM SERPL-SCNC: 134 MMOL/L (ref 136–145)
TIBC SERPL-MCNC: 224 MCG/DL (ref 250–450)
TRANSFERRIN SERPL-MCNC: 180 MG/DL (ref 200–360)
VIT B12 SERPL-MCNC: 936 PG/ML (ref 193–986)
WBC # SPEC AUTO: 2.5 X10(3)/MCL (ref 4.5–11)

## 2018-04-23 ENCOUNTER — HISTORICAL (OUTPATIENT)
Dept: ADMINISTRATIVE | Facility: HOSPITAL | Age: 63
End: 2018-04-23

## 2018-04-23 LAB
ABS NEUT (OLG): 5.89 X10(3)/MCL (ref 2.1–9.2)
ALBUMIN SERPL-MCNC: 3.2 GM/DL (ref 3.4–5)
ALBUMIN/GLOB SERPL: 1 RATIO (ref 1–2)
ALP SERPL-CCNC: 64 UNIT/L (ref 45–117)
ALT SERPL-CCNC: 17 UNIT/L (ref 12–78)
ANISOCYTOSIS BLD QL SMEAR: ABNORMAL
AST SERPL-CCNC: 23 UNIT/L (ref 15–37)
BILIRUB SERPL-MCNC: 0.3 MG/DL (ref 0.2–1)
BILIRUBIN DIRECT+TOT PNL SERPL-MCNC: 0.1 MG/DL
BILIRUBIN DIRECT+TOT PNL SERPL-MCNC: 0.2 MG/DL
BUN SERPL-MCNC: 8 MG/DL (ref 7–18)
CALCIUM SERPL-MCNC: 9.5 MG/DL (ref 8.5–10.1)
CHLORIDE SERPL-SCNC: 95 MMOL/L (ref 98–107)
CO2 SERPL-SCNC: 35 MMOL/L (ref 21–32)
CREAT SERPL-MCNC: 0.6 MG/DL (ref 0.6–1.3)
ERYTHROCYTE [DISTWIDTH] IN BLOOD BY AUTOMATED COUNT: ABNORMAL % (ref 11.5–14.5)
GLOBULIN SER-MCNC: 4.5 GM/ML (ref 2.3–3.5)
GLUCOSE SERPL-MCNC: 88 MG/DL (ref 74–106)
GRANULOCYTES NFR BLD MANUAL: 81 % (ref 43–75)
HCT VFR BLD AUTO: 30.9 % (ref 35–46)
HGB BLD-MCNC: 10.4 GM/DL (ref 12–16)
LYMPHOCYTES NFR BLD MANUAL: 6 % (ref 20.5–51.1)
MACROCYTES BLD QL SMEAR: ABNORMAL
MCH RBC QN AUTO: 35 PG (ref 26–34)
MCHC RBC AUTO-ENTMCNC: 33.7 GM/DL (ref 31–37)
MCV RBC AUTO: 104 FL (ref 80–100)
MONOCYTES NFR BLD MANUAL: 8 % (ref 2–9)
NEUTS BAND NFR BLD MANUAL: 5 % (ref 0–10)
PLATELET # BLD AUTO: 411 X10(3)/MCL (ref 130–400)
PLATELET # BLD EST: ABNORMAL 10*3/UL
PMV BLD AUTO: 8.8 FL (ref 7.4–10.4)
POTASSIUM SERPL-SCNC: 3.8 MMOL/L (ref 3.5–5.1)
PROT SERPL-MCNC: 7.7 GM/DL (ref 6.4–8.2)
RBC # BLD AUTO: 2.97 X10(6)/MCL (ref 4–5.2)
RBC MORPH BLD: ABNORMAL
SODIUM SERPL-SCNC: 132 MMOL/L (ref 136–145)
WBC # SPEC AUTO: 7.4 X10(3)/MCL (ref 4.5–11)

## 2018-05-09 ENCOUNTER — HISTORICAL (OUTPATIENT)
Dept: ENDOSCOPY | Facility: HOSPITAL | Age: 63
End: 2018-05-09

## 2018-05-15 ENCOUNTER — HISTORICAL (OUTPATIENT)
Dept: ADMINISTRATIVE | Facility: HOSPITAL | Age: 63
End: 2018-05-15

## 2018-05-28 ENCOUNTER — HISTORICAL (OUTPATIENT)
Dept: ADMINISTRATIVE | Facility: HOSPITAL | Age: 63
End: 2018-05-28

## 2018-05-28 ENCOUNTER — HISTORICAL (OUTPATIENT)
Dept: RADIOLOGY | Facility: HOSPITAL | Age: 63
End: 2018-05-28

## 2018-06-04 ENCOUNTER — HISTORICAL (OUTPATIENT)
Dept: ADMINISTRATIVE | Facility: HOSPITAL | Age: 63
End: 2018-06-04

## 2018-06-04 LAB
ABS NEUT (OLG): 6.2 X10(3)/MCL (ref 2.1–9.2)
ALBUMIN SERPL-MCNC: 2.8 GM/DL (ref 3.4–5)
ALBUMIN/GLOB SERPL: 1 RATIO (ref 1–2)
ALP SERPL-CCNC: 58 UNIT/L (ref 45–117)
ALT SERPL-CCNC: 13 UNIT/L (ref 12–78)
AST SERPL-CCNC: 11 UNIT/L (ref 15–37)
BASOPHILS # BLD AUTO: 0.05 X10(3)/MCL
BASOPHILS NFR BLD AUTO: 1 %
BILIRUB SERPL-MCNC: 0.2 MG/DL (ref 0.2–1)
BILIRUBIN DIRECT+TOT PNL SERPL-MCNC: <0.1 MG/DL
BILIRUBIN DIRECT+TOT PNL SERPL-MCNC: ABNORMAL MG/DL
BUN SERPL-MCNC: 14 MG/DL (ref 7–18)
CALCIUM SERPL-MCNC: 9.1 MG/DL (ref 8.5–10.1)
CHLORIDE SERPL-SCNC: 94 MMOL/L (ref 98–107)
CO2 SERPL-SCNC: 29 MMOL/L (ref 21–32)
CREAT SERPL-MCNC: 0.6 MG/DL (ref 0.6–1.3)
EOSINOPHIL # BLD AUTO: 0.23 10*3/UL
EOSINOPHIL NFR BLD AUTO: 3 %
ERYTHROCYTE [DISTWIDTH] IN BLOOD BY AUTOMATED COUNT: 14.4 % (ref 11.5–14.5)
GLOBULIN SER-MCNC: 4.2 GM/ML (ref 2.3–3.5)
GLUCOSE SERPL-MCNC: 66 MG/DL (ref 74–106)
HCT VFR BLD AUTO: 24.5 % (ref 35–46)
HGB BLD-MCNC: 8.1 GM/DL (ref 12–16)
IMM GRANULOCYTES # BLD AUTO: 0.1 10*3/UL
IMM GRANULOCYTES NFR BLD AUTO: 1 %
LYMPHOCYTES # BLD AUTO: 0.58 X10(3)/MCL
LYMPHOCYTES NFR BLD AUTO: 7 % (ref 13–40)
MCH RBC QN AUTO: 35.8 PG (ref 26–34)
MCHC RBC AUTO-ENTMCNC: 33.1 GM/DL (ref 31–37)
MCV RBC AUTO: 108.4 FL (ref 80–100)
MONOCYTES # BLD AUTO: 1.13 X10(3)/MCL
MONOCYTES NFR BLD AUTO: 14 % (ref 4–12)
NEUTROPHILS # BLD AUTO: 6.2 X10(3)/MCL
NEUTROPHILS NFR BLD AUTO: 75 X10(3)/MCL
PLATELET # BLD AUTO: 362 X10(3)/MCL (ref 130–400)
PMV BLD AUTO: 8.6 FL (ref 7.4–10.4)
POTASSIUM SERPL-SCNC: 3.9 MMOL/L (ref 3.5–5.1)
PROT SERPL-MCNC: 7 GM/DL (ref 6.4–8.2)
RBC # BLD AUTO: 2.26 X10(6)/MCL (ref 4–5.2)
SODIUM SERPL-SCNC: 132 MMOL/L (ref 136–145)
WBC # SPEC AUTO: 8.3 X10(3)/MCL (ref 4.5–11)

## 2018-06-20 ENCOUNTER — HISTORICAL (OUTPATIENT)
Dept: RADIOLOGY | Facility: HOSPITAL | Age: 63
End: 2018-06-20

## 2018-10-04 ENCOUNTER — HISTORICAL (OUTPATIENT)
Dept: SPEECH THERAPY | Facility: HOSPITAL | Age: 63
End: 2018-10-04

## 2018-10-30 ENCOUNTER — HISTORICAL (OUTPATIENT)
Dept: RADIOLOGY | Facility: HOSPITAL | Age: 63
End: 2018-10-30

## 2018-10-30 LAB
BUN SERPL-MCNC: 9 MG/DL (ref 7–18)
CALCIUM SERPL-MCNC: 10.1 MG/DL (ref 8.5–10.1)
CHLORIDE SERPL-SCNC: 95 MMOL/L (ref 98–107)
CO2 SERPL-SCNC: 32 MMOL/L (ref 21–32)
CREAT SERPL-MCNC: 1 MG/DL (ref 0.6–1.3)
CREAT/UREA NIT SERPL: 9
GLUCOSE SERPL-MCNC: 78 MG/DL (ref 74–106)
POTASSIUM SERPL-SCNC: 4.3 MMOL/L (ref 3.5–5.1)
SODIUM SERPL-SCNC: 130 MMOL/L (ref 136–145)

## 2018-12-04 ENCOUNTER — HISTORICAL (OUTPATIENT)
Dept: RADIOLOGY | Facility: HOSPITAL | Age: 63
End: 2018-12-04

## 2018-12-04 LAB
ABS NEUT (OLG): 3.91 X10(3)/MCL (ref 2.1–9.2)
ALBUMIN SERPL-MCNC: 3.2 GM/DL (ref 3.4–5)
ALBUMIN/GLOB SERPL: 1 RATIO (ref 1–2)
ALP SERPL-CCNC: 298 UNIT/L (ref 45–117)
ALT SERPL-CCNC: 68 UNIT/L (ref 12–78)
AST SERPL-CCNC: 72 UNIT/L (ref 15–37)
BASOPHILS # BLD AUTO: 0.05 X10(3)/MCL
BASOPHILS NFR BLD AUTO: 1 %
BILIRUB SERPL-MCNC: 0.1 MG/DL (ref 0.2–1)
BILIRUBIN DIRECT+TOT PNL SERPL-MCNC: <0.1 MG/DL
BILIRUBIN DIRECT+TOT PNL SERPL-MCNC: ABNORMAL MG/DL
BUN SERPL-MCNC: 11 MG/DL (ref 7–18)
CALCIUM SERPL-MCNC: 8.8 MG/DL (ref 8.5–10.1)
CHLORIDE SERPL-SCNC: 90 MMOL/L (ref 98–107)
CO2 SERPL-SCNC: 33 MMOL/L (ref 21–32)
CREAT SERPL-MCNC: 1 MG/DL (ref 0.6–1.3)
EOSINOPHIL # BLD AUTO: 0.26 X10(3)/MCL
EOSINOPHIL NFR BLD AUTO: 5 %
ERYTHROCYTE [DISTWIDTH] IN BLOOD BY AUTOMATED COUNT: 17.8 % (ref 11.5–14.5)
GLOBULIN SER-MCNC: 6.3 GM/ML (ref 2.3–3.5)
GLUCOSE SERPL-MCNC: 84 MG/DL (ref 74–106)
HCT VFR BLD AUTO: 29.3 % (ref 35–46)
HGB BLD-MCNC: 9.9 GM/DL (ref 12–16)
IMM GRANULOCYTES # BLD AUTO: 0.01 10*3/UL
IMM GRANULOCYTES NFR BLD AUTO: 0 %
LYMPHOCYTES # BLD AUTO: 0.6 X10(3)/MCL
LYMPHOCYTES NFR BLD AUTO: 11 % (ref 13–40)
MCH RBC QN AUTO: 30.7 PG (ref 26–34)
MCHC RBC AUTO-ENTMCNC: 33.8 GM/DL (ref 31–37)
MCV RBC AUTO: 90.7 FL (ref 80–100)
MONOCYTES # BLD AUTO: 0.66 X10(3)/MCL
MONOCYTES NFR BLD AUTO: 12 % (ref 4–12)
NEUTROPHILS # BLD AUTO: 3.91 X10(3)/MCL
NEUTROPHILS NFR BLD AUTO: 71 X10(3)/MCL
PLATELET # BLD AUTO: 285 X10(3)/MCL (ref 130–400)
PMV BLD AUTO: 9 FL (ref 7.4–10.4)
POTASSIUM SERPL-SCNC: 4.1 MMOL/L (ref 3.5–5.1)
PROT SERPL-MCNC: 9.5 GM/DL (ref 6.4–8.2)
RBC # BLD AUTO: 3.23 X10(6)/MCL (ref 4–5.2)
SODIUM SERPL-SCNC: 127 MMOL/L (ref 136–145)
WBC # SPEC AUTO: 5.5 X10(3)/MCL (ref 4.5–11)

## 2018-12-19 ENCOUNTER — HISTORICAL (OUTPATIENT)
Dept: SPEECH THERAPY | Facility: HOSPITAL | Age: 63
End: 2018-12-19

## 2018-12-28 ENCOUNTER — HISTORICAL (OUTPATIENT)
Dept: RADIOLOGY | Facility: HOSPITAL | Age: 63
End: 2018-12-28

## 2019-01-04 ENCOUNTER — HISTORICAL (OUTPATIENT)
Dept: ADMINISTRATIVE | Facility: HOSPITAL | Age: 64
End: 2019-01-04

## 2019-01-04 LAB
FERRITIN SERPL-MCNC: 33.7 NG/ML (ref 8–388)
FOLATE SERPL-MCNC: >100 NG/ML (ref 3.1–17.5)
IRON SATN MFR SERPL: 6.2 % (ref 15–50)
IRON SERPL-MCNC: 24 MCG/DL (ref 50–170)
RET# (OHS): 0.04 X10(6)/MCL (ref 0.02–0.08)
RETICULOCYTE COUNT AUTOMATED (OLG): 1.2 % (ref 0.5–1.5)
TIBC SERPL-MCNC: 390 MCG/DL (ref 250–450)
TRANSFERRIN SERPL-MCNC: 306 MG/DL (ref 200–360)
VIT B12 SERPL-MCNC: 1591 PG/ML (ref 193–986)

## 2019-01-09 ENCOUNTER — HISTORICAL (OUTPATIENT)
Dept: ENDOSCOPY | Facility: HOSPITAL | Age: 64
End: 2019-01-09

## 2019-04-04 ENCOUNTER — HISTORICAL (OUTPATIENT)
Dept: ADMINISTRATIVE | Facility: HOSPITAL | Age: 64
End: 2019-04-04

## 2019-04-04 ENCOUNTER — HISTORICAL (OUTPATIENT)
Dept: RADIOLOGY | Facility: HOSPITAL | Age: 64
End: 2019-04-04

## 2019-04-04 LAB
ABS NEUT (OLG): 8.63 X10(3)/MCL (ref 2.1–9.2)
ALBUMIN SERPL-MCNC: 2.3 GM/DL (ref 3.4–5)
ALBUMIN/GLOB SERPL: 0.4 RATIO (ref 1.1–2)
ALP SERPL-CCNC: 280 UNIT/L (ref 45–117)
ALT SERPL-CCNC: 23 UNIT/L (ref 12–78)
AST SERPL-CCNC: 31 UNIT/L (ref 15–37)
BASOPHILS # BLD AUTO: 0.05 X10(3)/MCL
BASOPHILS NFR BLD AUTO: 0 %
BILIRUB SERPL-MCNC: 0.2 MG/DL (ref 0.2–1)
BILIRUBIN DIRECT+TOT PNL SERPL-MCNC: 0.1 MG/DL
BILIRUBIN DIRECT+TOT PNL SERPL-MCNC: 0.1 MG/DL
BUN SERPL-MCNC: 7 MG/DL (ref 7–18)
CALCIUM SERPL-MCNC: 9 MG/DL (ref 8.5–10.1)
CHLORIDE SERPL-SCNC: 94 MMOL/L (ref 98–107)
CO2 SERPL-SCNC: 33 MMOL/L (ref 21–32)
CREAT SERPL-MCNC: 0.8 MG/DL (ref 0.6–1.3)
EOSINOPHIL # BLD AUTO: 0.08 X10(3)/MCL
EOSINOPHIL NFR BLD AUTO: 1 %
ERYTHROCYTE [DISTWIDTH] IN BLOOD BY AUTOMATED COUNT: 16.9 % (ref 11.5–14.5)
FERRITIN SERPL-MCNC: 108 NG/ML (ref 8–388)
GLOBULIN SER-MCNC: 5.6 GM/ML (ref 2.3–3.5)
GLUCOSE SERPL-MCNC: 67 MG/DL (ref 74–106)
HCT VFR BLD AUTO: 32 % (ref 35–46)
HGB BLD-MCNC: 10.7 GM/DL (ref 12–16)
IMM GRANULOCYTES # BLD AUTO: 0.07 10*3/UL
IMM GRANULOCYTES NFR BLD AUTO: 1 %
IRON SATN MFR SERPL: 8.3 % (ref 15–50)
IRON SERPL-MCNC: 27 MCG/DL (ref 50–170)
LYMPHOCYTES # BLD AUTO: 0.79 X10(3)/MCL
LYMPHOCYTES NFR BLD AUTO: 8 % (ref 13–40)
MCH RBC QN AUTO: 30.7 PG (ref 26–34)
MCHC RBC AUTO-ENTMCNC: 33.4 GM/DL (ref 31–37)
MCV RBC AUTO: 91.7 FL (ref 80–100)
MONOCYTES # BLD AUTO: 0.98 X10(3)/MCL
MONOCYTES NFR BLD AUTO: 9 % (ref 4–12)
NEUTROPHILS # BLD AUTO: 8.63 X10(3)/MCL
NEUTROPHILS NFR BLD AUTO: 81 X10(3)/MCL
PLATELET # BLD AUTO: 540 X10(3)/MCL (ref 130–400)
PMV BLD AUTO: 8.5 FL (ref 7.4–10.4)
POTASSIUM SERPL-SCNC: 3.7 MMOL/L (ref 3.5–5.1)
PROT SERPL-MCNC: 7.1 GM/DL
PROT SERPL-MCNC: 7.9 GM/DL (ref 6.4–8.2)
RBC # BLD AUTO: 3.49 X10(6)/MCL (ref 4–5.2)
SODIUM SERPL-SCNC: 131 MMOL/L (ref 136–145)
T4 FREE SERPL-MCNC: 0.36 NG/DL (ref 0.76–1.46)
TIBC SERPL-MCNC: 326 MCG/DL (ref 250–450)
TRANSFERRIN SERPL-MCNC: 216 MG/DL (ref 200–360)
TSH SERPL-ACNC: 77 MIU/L (ref 0.36–3.74)
WBC # SPEC AUTO: 10.6 X10(3)/MCL (ref 4.5–11)

## 2019-04-15 ENCOUNTER — HISTORICAL (OUTPATIENT)
Dept: INTERNAL MEDICINE | Facility: CLINIC | Age: 64
End: 2019-04-15

## 2019-04-15 LAB
ABS NEUT (OLG): 3.79 X10(3)/MCL (ref 2.1–9.2)
ALBUMIN SERPL-MCNC: 2.8 GM/DL (ref 3.4–5)
ALBUMIN/GLOB SERPL: 0.5 RATIO (ref 1.1–2)
ALP SERPL-CCNC: 244 UNIT/L (ref 45–117)
ALT SERPL-CCNC: 26 UNIT/L (ref 12–78)
AST SERPL-CCNC: 38 UNIT/L (ref 15–37)
BASOPHILS # BLD AUTO: 0.07 X10(3)/MCL
BASOPHILS NFR BLD AUTO: 1 %
BILIRUB SERPL-MCNC: 0.2 MG/DL (ref 0.2–1)
BILIRUBIN DIRECT+TOT PNL SERPL-MCNC: 0.1 MG/DL
BILIRUBIN DIRECT+TOT PNL SERPL-MCNC: 0.1 MG/DL
BUN SERPL-MCNC: 5 MG/DL (ref 7–18)
CALCIUM SERPL-MCNC: 9.5 MG/DL (ref 8.5–10.1)
CHLORIDE SERPL-SCNC: 96 MMOL/L (ref 98–107)
CHOLEST SERPL-MCNC: 194 MG/DL
CHOLEST/HDLC SERPL: 2 {RATIO} (ref 0–4.4)
CO2 SERPL-SCNC: 32 MMOL/L (ref 21–32)
CREAT SERPL-MCNC: 0.9 MG/DL (ref 0.6–1.3)
EOSINOPHIL # BLD AUTO: 0.16 10*3/UL
EOSINOPHIL NFR BLD AUTO: 3 %
ERYTHROCYTE [DISTWIDTH] IN BLOOD BY AUTOMATED COUNT: 16.6 % (ref 11.5–14.5)
GLOBULIN SER-MCNC: 5.9 GM/ML (ref 2.3–3.5)
GLUCOSE SERPL-MCNC: 78 MG/DL (ref 74–106)
HCT VFR BLD AUTO: 39.3 % (ref 35–46)
HDLC SERPL-MCNC: 95 MG/DL
HGB BLD-MCNC: 12.5 GM/DL (ref 12–16)
IMM GRANULOCYTES # BLD AUTO: 0.03 10*3/UL
IMM GRANULOCYTES NFR BLD AUTO: 0 %
LDLC SERPL CALC-MCNC: 84 MG/DL (ref 0–130)
LYMPHOCYTES # BLD AUTO: 1 X10(3)/MCL
LYMPHOCYTES NFR BLD AUTO: 18 % (ref 13–40)
MCH RBC QN AUTO: 29.2 PG (ref 26–34)
MCHC RBC AUTO-ENTMCNC: 31.8 GM/DL (ref 31–37)
MCV RBC AUTO: 91.8 FL (ref 80–100)
MONOCYTES # BLD AUTO: 0.66 X10(3)/MCL
MONOCYTES NFR BLD AUTO: 12 % (ref 4–12)
NEUTROPHILS # BLD AUTO: 3.79 X10(3)/MCL
NEUTROPHILS NFR BLD AUTO: 66 X10(3)/MCL
PLATELET # BLD AUTO: 364 X10(3)/MCL (ref 130–400)
PMV BLD AUTO: 8.8 FL (ref 7.4–10.4)
POTASSIUM SERPL-SCNC: 3.9 MMOL/L (ref 3.5–5.1)
PROT SERPL-MCNC: 8.7 GM/DL (ref 6.4–8.2)
RBC # BLD AUTO: 4.28 X10(6)/MCL (ref 4–5.2)
SODIUM SERPL-SCNC: 130 MMOL/L (ref 136–145)
TRIGL SERPL-MCNC: 73 MG/DL
VLDLC SERPL CALC-MCNC: 15 MG/DL
WBC # SPEC AUTO: 5.7 X10(3)/MCL (ref 4.5–11)

## 2019-04-16 ENCOUNTER — HISTORICAL (OUTPATIENT)
Dept: INFUSION THERAPY | Facility: HOSPITAL | Age: 64
End: 2019-04-16

## 2019-04-23 ENCOUNTER — HISTORICAL (OUTPATIENT)
Dept: INFUSION THERAPY | Facility: HOSPITAL | Age: 64
End: 2019-04-23

## 2019-06-10 ENCOUNTER — HISTORICAL (OUTPATIENT)
Dept: ADMINISTRATIVE | Facility: HOSPITAL | Age: 64
End: 2019-06-10

## 2019-06-10 LAB
ABS NEUT (OLG): 5.26 X10(3)/MCL (ref 2.1–9.2)
ALBUMIN SERPL-MCNC: 3.3 GM/DL (ref 3.4–5)
ALBUMIN/GLOB SERPL: 0.5 RATIO (ref 1.1–2)
ALP SERPL-CCNC: 390 UNIT/L (ref 45–117)
ALT SERPL-CCNC: 65 UNIT/L (ref 12–78)
AST SERPL-CCNC: 53 UNIT/L (ref 15–37)
BASOPHILS # BLD AUTO: 0.06 X10(3)/MCL
BASOPHILS NFR BLD AUTO: 1 %
BILIRUB SERPL-MCNC: 0.2 MG/DL (ref 0.2–1)
BILIRUBIN DIRECT+TOT PNL SERPL-MCNC: 0 MG/DL
BILIRUBIN DIRECT+TOT PNL SERPL-MCNC: 0.2 MG/DL
BUN SERPL-MCNC: 9 MG/DL (ref 7–18)
CALCIUM SERPL-MCNC: 9.6 MG/DL (ref 8.5–10.1)
CHLORIDE SERPL-SCNC: 94 MMOL/L (ref 98–107)
CO2 SERPL-SCNC: 31 MMOL/L (ref 21–32)
CREAT SERPL-MCNC: 0.8 MG/DL (ref 0.6–1.3)
EOSINOPHIL # BLD AUTO: 0.44 X10(3)/MCL
EOSINOPHIL NFR BLD AUTO: 6 %
ERYTHROCYTE [DISTWIDTH] IN BLOOD BY AUTOMATED COUNT: 14.8 % (ref 11.5–14.5)
FERRITIN SERPL-MCNC: 535.1 NG/ML (ref 8–388)
GLOBULIN SER-MCNC: 6.1 GM/ML (ref 2.3–3.5)
GLUCOSE SERPL-MCNC: 107 MG/DL (ref 74–106)
HCT VFR BLD AUTO: 41.9 % (ref 35–46)
HGB BLD-MCNC: 14.5 GM/DL (ref 12–16)
IMM GRANULOCYTES # BLD AUTO: 0.03 10*3/UL
IMM GRANULOCYTES NFR BLD AUTO: 0 %
IRON SATN MFR SERPL: 26.1 % (ref 15–50)
IRON SERPL-MCNC: 63 MCG/DL (ref 50–170)
LYMPHOCYTES # BLD AUTO: 0.71 X10(3)/MCL
LYMPHOCYTES NFR BLD AUTO: 10 % (ref 13–40)
MCH RBC QN AUTO: 33.5 PG (ref 26–34)
MCHC RBC AUTO-ENTMCNC: 34.6 GM/DL (ref 31–37)
MCV RBC AUTO: 96.8 FL (ref 80–100)
MONOCYTES # BLD AUTO: 0.58 X10(3)/MCL
MONOCYTES NFR BLD AUTO: 8 % (ref 4–12)
NEUTROPHILS # BLD AUTO: 5.26 X10(3)/MCL
NEUTROPHILS NFR BLD AUTO: 74 X10(3)/MCL
PLATELET # BLD AUTO: 299 X10(3)/MCL (ref 130–400)
PMV BLD AUTO: 9 FL (ref 7.4–10.4)
POTASSIUM SERPL-SCNC: 3.5 MMOL/L (ref 3.5–5.1)
PROT SERPL-MCNC: 9.4 GM/DL (ref 6.4–8.2)
RBC # BLD AUTO: 4.33 X10(6)/MCL (ref 4–5.2)
SODIUM SERPL-SCNC: 129 MMOL/L (ref 136–145)
T4 FREE SERPL-MCNC: 0.91 NG/DL (ref 0.76–1.46)
TIBC SERPL-MCNC: 241 MCG/DL (ref 250–450)
TRANSFERRIN SERPL-MCNC: 202 MG/DL (ref 200–360)
TSH SERPL-ACNC: 14.8 MIU/L (ref 0.36–3.74)
WBC # SPEC AUTO: 7.1 X10(3)/MCL (ref 4.5–11)

## 2019-06-25 ENCOUNTER — HISTORICAL (OUTPATIENT)
Dept: ADMINISTRATIVE | Facility: HOSPITAL | Age: 64
End: 2019-06-25

## 2019-06-29 LAB — FINAL CULTURE: NORMAL

## 2019-07-02 ENCOUNTER — HISTORICAL (OUTPATIENT)
Dept: LAB | Facility: HOSPITAL | Age: 64
End: 2019-07-02

## 2019-07-02 LAB
T4 FREE SERPL-MCNC: 1.2 NG/DL (ref 0.76–1.46)
TSH SERPL-ACNC: 2.24 MIU/L (ref 0.36–3.74)

## 2019-07-12 ENCOUNTER — HISTORICAL (OUTPATIENT)
Dept: RADIOLOGY | Facility: HOSPITAL | Age: 64
End: 2019-07-12

## 2019-07-12 LAB
ABS NEUT (OLG): 3.28 X10(3)/MCL (ref 2.1–9.2)
ALBUMIN SERPL-MCNC: 3.2 GM/DL (ref 3.4–5)
ALBUMIN/GLOB SERPL: 0.6 RATIO (ref 1.1–2)
ALP SERPL-CCNC: 341 UNIT/L (ref 45–117)
ALT SERPL-CCNC: 58 UNIT/L (ref 12–78)
AST SERPL-CCNC: 42 UNIT/L (ref 15–37)
BASOPHILS # BLD AUTO: 0.08 X10(3)/MCL
BASOPHILS NFR BLD AUTO: 1 %
BILIRUB SERPL-MCNC: 0.3 MG/DL (ref 0.2–1)
BILIRUBIN DIRECT+TOT PNL SERPL-MCNC: 0.1 MG/DL
BILIRUBIN DIRECT+TOT PNL SERPL-MCNC: 0.2 MG/DL
BUN SERPL-MCNC: 9 MG/DL (ref 7–18)
CALCIUM SERPL-MCNC: 9.7 MG/DL (ref 8.5–10.1)
CHLORIDE SERPL-SCNC: 95 MMOL/L (ref 98–107)
CO2 SERPL-SCNC: 35 MMOL/L (ref 21–32)
CREAT SERPL-MCNC: 0.7 MG/DL (ref 0.6–1.3)
EOSINOPHIL # BLD AUTO: 0.4 X10(3)/MCL
EOSINOPHIL NFR BLD AUTO: 7 %
ERYTHROCYTE [DISTWIDTH] IN BLOOD BY AUTOMATED COUNT: 13.6 % (ref 11.5–14.5)
GLOBULIN SER-MCNC: 5.7 GM/ML (ref 2.3–3.5)
GLUCOSE SERPL-MCNC: 73 MG/DL (ref 74–106)
HCT VFR BLD AUTO: 40.6 % (ref 35–46)
HGB BLD-MCNC: 13.5 GM/DL (ref 12–16)
IMM GRANULOCYTES # BLD AUTO: 0.01 10*3/UL
IMM GRANULOCYTES NFR BLD AUTO: 0 %
LYMPHOCYTES # BLD AUTO: 1.09 X10(3)/MCL
LYMPHOCYTES NFR BLD AUTO: 20 % (ref 13–40)
MCH RBC QN AUTO: 32.5 PG (ref 26–34)
MCHC RBC AUTO-ENTMCNC: 33.3 GM/DL (ref 31–37)
MCV RBC AUTO: 97.6 FL (ref 80–100)
MONOCYTES # BLD AUTO: 0.73 X10(3)/MCL
MONOCYTES NFR BLD AUTO: 13 % (ref 4–12)
NEUTROPHILS # BLD AUTO: 3.28 X10(3)/MCL
NEUTROPHILS NFR BLD AUTO: 59 X10(3)/MCL
PLATELET # BLD AUTO: 244 X10(3)/MCL (ref 130–400)
PMV BLD AUTO: 8.4 FL (ref 7.4–10.4)
POTASSIUM SERPL-SCNC: 3.8 MMOL/L (ref 3.5–5.1)
PROT SERPL-MCNC: 8.9 GM/DL (ref 6.4–8.2)
RBC # BLD AUTO: 4.16 X10(6)/MCL (ref 4–5.2)
SODIUM SERPL-SCNC: 132 MMOL/L (ref 136–145)
TSH SERPL-ACNC: 2.55 MIU/L (ref 0.36–3.74)
WBC # SPEC AUTO: 5.6 X10(3)/MCL (ref 4.5–11)

## 2019-08-27 ENCOUNTER — HISTORICAL (OUTPATIENT)
Dept: ADMINISTRATIVE | Facility: HOSPITAL | Age: 64
End: 2019-08-27

## 2019-11-13 ENCOUNTER — HISTORICAL (OUTPATIENT)
Dept: ADMINISTRATIVE | Facility: HOSPITAL | Age: 64
End: 2019-11-13

## 2019-11-13 LAB — GGT SERPL-CCNC: 237 UNIT/L (ref 5–85)

## 2020-01-08 ENCOUNTER — HISTORICAL (OUTPATIENT)
Dept: ADMINISTRATIVE | Facility: HOSPITAL | Age: 65
End: 2020-01-08

## 2020-01-08 LAB
ABS NEUT (OLG): 4.23 X10(3)/MCL (ref 2.1–9.2)
ALBUMIN SERPL-MCNC: 3.1 GM/DL (ref 3.4–5)
ALBUMIN/GLOB SERPL: 0.6 RATIO (ref 1.1–2)
ALP SERPL-CCNC: 205 UNIT/L (ref 45–117)
ALT SERPL-CCNC: 51 UNIT/L (ref 12–78)
AST SERPL-CCNC: 46 UNIT/L (ref 15–37)
BASOPHILS # BLD AUTO: 0.1 X10(3)/MCL (ref 0–0.2)
BASOPHILS NFR BLD AUTO: 1 %
BILIRUB SERPL-MCNC: 0.2 MG/DL (ref 0.2–1)
BILIRUBIN DIRECT+TOT PNL SERPL-MCNC: <0.1 MG/DL (ref 0–0.2)
BILIRUBIN DIRECT+TOT PNL SERPL-MCNC: ABNORMAL MG/DL
BUN SERPL-MCNC: 16 MG/DL (ref 7–18)
CALCIUM SERPL-MCNC: 9.2 MG/DL (ref 8.5–10.1)
CHLORIDE SERPL-SCNC: 95 MMOL/L (ref 98–107)
CO2 SERPL-SCNC: 31 MMOL/L (ref 21–32)
CREAT SERPL-MCNC: 0.7 MG/DL (ref 0.6–1.3)
EOSINOPHIL # BLD AUTO: 0.4 X10(3)/MCL (ref 0–0.9)
EOSINOPHIL NFR BLD AUTO: 6 %
ERYTHROCYTE [DISTWIDTH] IN BLOOD BY AUTOMATED COUNT: 13.4 % (ref 11.5–14.5)
GLOBULIN SER-MCNC: 5.3 GM/ML (ref 2.3–3.5)
GLUCOSE SERPL-MCNC: 84 MG/DL (ref 74–106)
HCT VFR BLD AUTO: 34.8 % (ref 35–46)
HGB BLD-MCNC: 11.5 GM/DL (ref 12–16)
IMM GRANULOCYTES # BLD AUTO: 0.04 10*3/UL
IMM GRANULOCYTES NFR BLD AUTO: 1 %
LYMPHOCYTES # BLD AUTO: 1.1 X10(3)/MCL (ref 0.6–4.6)
LYMPHOCYTES NFR BLD AUTO: 16 %
MCH RBC QN AUTO: 30.8 PG (ref 26–34)
MCHC RBC AUTO-ENTMCNC: 33 GM/DL (ref 31–37)
MCV RBC AUTO: 93.3 FL (ref 80–100)
MONOCYTES # BLD AUTO: 1 X10(3)/MCL (ref 0.1–1.3)
MONOCYTES NFR BLD AUTO: 15 %
NEUTROPHILS # BLD AUTO: 4.23 X10(3)/MCL (ref 2.1–9.2)
NEUTROPHILS NFR BLD AUTO: 62 %
PLATELET # BLD AUTO: 382 X10(3)/MCL (ref 130–400)
PMV BLD AUTO: 8.6 FL (ref 7.4–10.4)
POTASSIUM SERPL-SCNC: 3.9 MMOL/L (ref 3.5–5.1)
PROT SERPL-MCNC: 8.4 GM/DL (ref 6.4–8.2)
RBC # BLD AUTO: 3.73 X10(6)/MCL (ref 4–5.2)
SODIUM SERPL-SCNC: 129 MMOL/L (ref 136–145)
T4 FREE SERPL-MCNC: 1.18 NG/DL (ref 0.76–1.46)
TSH SERPL-ACNC: 0.66 MIU/L (ref 0.36–3.74)
WBC # SPEC AUTO: 6.8 X10(3)/MCL (ref 4.5–11)

## 2020-01-23 ENCOUNTER — HISTORICAL (OUTPATIENT)
Dept: RADIOLOGY | Facility: HOSPITAL | Age: 65
End: 2020-01-23

## 2020-02-13 ENCOUNTER — HISTORICAL (OUTPATIENT)
Dept: RADIOLOGY | Facility: HOSPITAL | Age: 65
End: 2020-02-13

## 2020-05-25 ENCOUNTER — HISTORICAL (OUTPATIENT)
Dept: ADMINISTRATIVE | Facility: HOSPITAL | Age: 65
End: 2020-05-25

## 2020-05-25 LAB — PREALB SERPL-MCNC: 17.4 MG/DL (ref 14–37)

## 2020-06-12 ENCOUNTER — HISTORICAL (OUTPATIENT)
Dept: ADMINISTRATIVE | Facility: HOSPITAL | Age: 65
End: 2020-06-12

## 2020-06-12 LAB
ABS NEUT (OLG): 3.53 X10(3)/MCL (ref 2.1–9.2)
BUN SERPL-MCNC: 21.8 MG/DL (ref 9.8–20.1)
CALCIUM SERPL-MCNC: 9.3 MG/DL (ref 8.4–10.2)
CHLORIDE SERPL-SCNC: 98 MMOL/L (ref 98–107)
CO2 SERPL-SCNC: 32 MMOL/L (ref 23–31)
CREAT SERPL-MCNC: 0.79 MG/DL (ref 0.57–1.11)
CREAT/UREA NIT SERPL: 28
ERYTHROCYTE [DISTWIDTH] IN BLOOD BY AUTOMATED COUNT: 12.6 % (ref 11.5–17)
GLUCOSE SERPL-MCNC: 115 MG/DL (ref 82–115)
HCT VFR BLD AUTO: 30.7 % (ref 37–47)
HGB BLD-MCNC: 9.8 GM/DL (ref 12–16)
MCH RBC QN AUTO: 30.1 PG (ref 27–31)
MCHC RBC AUTO-ENTMCNC: 31.9 GM/DL (ref 33–36)
MCV RBC AUTO: 94.2 FL (ref 80–94)
NRBC BLD AUTO-RTO: 0 % (ref 0–0.2)
PLATELET # BLD AUTO: 234 X10(3)/MCL (ref 130–400)
PMV BLD AUTO: 10.5 FL (ref 7.4–10.4)
POTASSIUM SERPL-SCNC: 4.2 MMOL/L (ref 3.5–5.1)
RBC # BLD AUTO: 3.26 X10(6)/MCL (ref 4.2–5.4)
SODIUM SERPL-SCNC: 135 MMOL/L (ref 136–145)
WBC # SPEC AUTO: 6.2 X10(3)/MCL (ref 4.5–11.5)

## 2020-07-07 ENCOUNTER — HISTORICAL (OUTPATIENT)
Dept: RADIOLOGY | Facility: HOSPITAL | Age: 65
End: 2020-07-07

## 2020-07-10 ENCOUNTER — HISTORICAL (OUTPATIENT)
Dept: HEMATOLOGY/ONCOLOGY | Facility: CLINIC | Age: 65
End: 2020-07-10

## 2020-07-10 LAB
ABS NEUT (OLG): 3.83 X10(3)/MCL (ref 2.1–9.2)
ALBUMIN SERPL-MCNC: 3.1 GM/DL (ref 3.4–5)
ALBUMIN/GLOB SERPL: 0.6 RATIO (ref 1.1–2)
ALP SERPL-CCNC: 219 UNIT/L (ref 45–117)
ALT SERPL-CCNC: 42 UNIT/L (ref 12–78)
AST SERPL-CCNC: 38 UNIT/L (ref 15–37)
BASOPHILS # BLD AUTO: 0.1 X10(3)/MCL (ref 0–0.2)
BASOPHILS NFR BLD AUTO: 1 %
BILIRUB SERPL-MCNC: 0.3 MG/DL (ref 0.2–1)
BILIRUBIN DIRECT+TOT PNL SERPL-MCNC: 0.1 MG/DL (ref 0–0.2)
BILIRUBIN DIRECT+TOT PNL SERPL-MCNC: 0.2 MG/DL
BUN SERPL-MCNC: 14 MG/DL (ref 7–18)
CALCIUM SERPL-MCNC: 9 MG/DL (ref 8.5–10.1)
CHLORIDE SERPL-SCNC: 100 MMOL/L (ref 98–107)
CO2 SERPL-SCNC: 29 MMOL/L (ref 21–32)
CREAT SERPL-MCNC: 0.8 MG/DL (ref 0.6–1.3)
EOSINOPHIL # BLD AUTO: 0.5 X10(3)/MCL (ref 0–0.9)
EOSINOPHIL NFR BLD AUTO: 8 %
ERYTHROCYTE [DISTWIDTH] IN BLOOD BY AUTOMATED COUNT: 13.6 % (ref 11.5–14.5)
GLOBULIN SER-MCNC: 5.5 GM/ML (ref 2.3–3.5)
GLUCOSE SERPL-MCNC: 71 MG/DL (ref 74–106)
HCT VFR BLD AUTO: 36.3 % (ref 35–46)
HGB BLD-MCNC: 11.4 GM/DL (ref 12–16)
IMM GRANULOCYTES # BLD AUTO: 0.01 10*3/UL
IMM GRANULOCYTES NFR BLD AUTO: 0 %
LYMPHOCYTES # BLD AUTO: 1 X10(3)/MCL (ref 0.6–4.6)
LYMPHOCYTES NFR BLD AUTO: 15 %
MCH RBC QN AUTO: 29.5 PG (ref 26–34)
MCHC RBC AUTO-ENTMCNC: 31.4 GM/DL (ref 31–37)
MCV RBC AUTO: 94 FL (ref 80–100)
MONOCYTES # BLD AUTO: 0.9 X10(3)/MCL (ref 0.1–1.3)
MONOCYTES NFR BLD AUTO: 14 %
NEUTROPHILS # BLD AUTO: 3.83 X10(3)/MCL (ref 2.1–9.2)
NEUTROPHILS NFR BLD AUTO: 61 %
PLATELET # BLD AUTO: 367 X10(3)/MCL (ref 130–400)
PMV BLD AUTO: 9.3 FL (ref 7.4–10.4)
POTASSIUM SERPL-SCNC: 3.9 MMOL/L (ref 3.5–5.1)
PROT SERPL-MCNC: 8.6 GM/DL (ref 6.4–8.2)
RBC # BLD AUTO: 3.86 X10(6)/MCL (ref 4–5.2)
SODIUM SERPL-SCNC: 135 MMOL/L (ref 136–145)
T4 FREE SERPL-MCNC: 1.6 NG/DL (ref 0.76–1.46)
TSH SERPL-ACNC: 0.08 MIU/L (ref 0.36–3.74)
WBC # SPEC AUTO: 6.2 X10(3)/MCL (ref 4.5–11)

## 2020-10-13 ENCOUNTER — HISTORICAL (OUTPATIENT)
Dept: INTERNAL MEDICINE | Facility: CLINIC | Age: 65
End: 2020-10-13

## 2020-10-13 LAB
ALBUMIN SERPL-MCNC: 3.6 GM/DL (ref 3.4–5)
ALBUMIN/GLOB SERPL: 0.6 RATIO (ref 1.1–2)
ALP SERPL-CCNC: 247 UNIT/L (ref 45–117)
ALT SERPL-CCNC: 49 UNIT/L (ref 12–78)
AST SERPL-CCNC: 54 UNIT/L (ref 15–37)
BILIRUB SERPL-MCNC: 0.5 MG/DL (ref 0.2–1)
BILIRUBIN DIRECT+TOT PNL SERPL-MCNC: 0.2 MG/DL (ref 0–0.2)
BILIRUBIN DIRECT+TOT PNL SERPL-MCNC: 0.3 MG/DL
BUN SERPL-MCNC: 8 MG/DL (ref 7–18)
CALCIUM SERPL-MCNC: 9.8 MG/DL (ref 8.5–10.1)
CHLORIDE SERPL-SCNC: 97 MMOL/L (ref 98–107)
CHOLEST SERPL-MCNC: 214 MG/DL
CHOLEST/HDLC SERPL: 1.6 {RATIO} (ref 0–4.4)
CO2 SERPL-SCNC: 32 MMOL/L (ref 21–32)
CREAT SERPL-MCNC: 0.8 MG/DL (ref 0.6–1.3)
GLOBULIN SER-MCNC: 5.8 GM/ML (ref 2.3–3.5)
GLUCOSE SERPL-MCNC: 66 MG/DL (ref 74–106)
HDLC SERPL-MCNC: 131 MG/DL (ref 40–59)
LDLC SERPL CALC-MCNC: 70 MG/DL
POTASSIUM SERPL-SCNC: 3.6 MMOL/L (ref 3.5–5.1)
PROT SERPL-MCNC: 9.4 GM/DL (ref 6.4–8.2)
SODIUM SERPL-SCNC: 134 MMOL/L (ref 136–145)
T4 FREE SERPL-MCNC: 0.93 NG/DL (ref 0.76–1.46)
TRIGL SERPL-MCNC: 65 MG/DL
TSH SERPL-ACNC: 13.2 MIU/L (ref 0.36–3.74)
VLDLC SERPL CALC-MCNC: 13 MG/DL

## 2020-10-22 ENCOUNTER — HISTORICAL (OUTPATIENT)
Dept: RADIOLOGY | Facility: HOSPITAL | Age: 65
End: 2020-10-22

## 2020-10-23 ENCOUNTER — HISTORICAL (OUTPATIENT)
Dept: RADIOLOGY | Facility: HOSPITAL | Age: 65
End: 2020-10-23

## 2020-10-30 ENCOUNTER — HISTORICAL (OUTPATIENT)
Dept: RADIOLOGY | Facility: HOSPITAL | Age: 65
End: 2020-10-30

## 2020-10-30 LAB
T4 FREE SERPL-MCNC: 1.46 NG/DL (ref 0.7–1.48)
TSH SERPL-ACNC: 0.25 UIU/ML (ref 0.35–4.94)

## 2020-12-01 ENCOUNTER — HISTORICAL (OUTPATIENT)
Dept: LAB | Facility: HOSPITAL | Age: 65
End: 2020-12-01

## 2020-12-01 ENCOUNTER — HISTORICAL (OUTPATIENT)
Dept: RADIOLOGY | Facility: HOSPITAL | Age: 65
End: 2020-12-01

## 2020-12-01 LAB
ABS NEUT (OLG): 4.15 X10(3)/MCL (ref 2.1–9.2)
ALBUMIN SERPL-MCNC: 3.8 GM/DL (ref 3.4–4.8)
ALBUMIN/GLOB SERPL: 0.8 RATIO (ref 1.1–2)
ALP SERPL-CCNC: 299 UNIT/L (ref 40–150)
ALT SERPL-CCNC: 46 UNIT/L (ref 0–55)
AST SERPL-CCNC: 86 UNIT/L (ref 5–34)
BASOPHILS # BLD AUTO: 0 X10(3)/MCL (ref 0–0.2)
BASOPHILS NFR BLD AUTO: 1 %
BILIRUB SERPL-MCNC: 0.5 MG/DL
BILIRUBIN DIRECT+TOT PNL SERPL-MCNC: 0.2 MG/DL (ref 0–0.5)
BILIRUBIN DIRECT+TOT PNL SERPL-MCNC: 0.3 MG/DL (ref 0–0.8)
BUN SERPL-MCNC: 14 MG/DL (ref 9.8–20.1)
CALCIUM SERPL-MCNC: 9.7 MG/DL (ref 8.4–10.2)
CHLORIDE SERPL-SCNC: 93 MMOL/L (ref 98–107)
CO2 SERPL-SCNC: 30 MMOL/L (ref 23–31)
CREAT SERPL-MCNC: 0.8 MG/DL (ref 0.55–1.02)
CREAT UR-MCNC: 32.9 MG/DL (ref 45–106)
EOSINOPHIL # BLD AUTO: 0.4 X10(3)/MCL (ref 0–0.9)
EOSINOPHIL NFR BLD AUTO: 5 %
ERYTHROCYTE [DISTWIDTH] IN BLOOD BY AUTOMATED COUNT: 13.6 % (ref 11.5–14.5)
FERRITIN SERPL-MCNC: 87.86 NG/ML (ref 4.63–204)
GLOBULIN SER-MCNC: 4.9 GM/DL (ref 2.4–3.5)
GLUCOSE SERPL-MCNC: 79 MG/DL (ref 82–115)
HCT VFR BLD AUTO: 39.3 % (ref 35–46)
HGB BLD-MCNC: 12.8 GM/DL (ref 12–16)
IMM GRANULOCYTES # BLD AUTO: 0.03 10*3/UL
IMM GRANULOCYTES NFR BLD AUTO: 0 %
IRON SATN MFR SERPL: 28 % (ref 20–50)
IRON SERPL-MCNC: 90 UG/DL (ref 50–170)
LYMPHOCYTES # BLD AUTO: 1.7 X10(3)/MCL (ref 0.6–4.6)
LYMPHOCYTES NFR BLD AUTO: 24 %
MAGNESIUM SERPL-MCNC: 1.51 MG/DL (ref 1.6–2.6)
MCH RBC QN AUTO: 30.7 PG (ref 26–34)
MCHC RBC AUTO-ENTMCNC: 32.6 GM/DL (ref 31–37)
MCV RBC AUTO: 94.2 FL (ref 80–100)
MONOCYTES # BLD AUTO: 1 X10(3)/MCL (ref 0.1–1.3)
MONOCYTES NFR BLD AUTO: 14 %
NEUTROPHILS # BLD AUTO: 4.15 X10(3)/MCL (ref 2.1–9.2)
NEUTROPHILS NFR BLD AUTO: 57 %
PHOSPHATE SERPL-MCNC: 3.8 MG/DL (ref 2.3–4.7)
PLATELET # BLD AUTO: 306 X10(3)/MCL (ref 130–400)
PMV BLD AUTO: 8.8 FL (ref 7.4–10.4)
POTASSIUM SERPL-SCNC: 4.2 MMOL/L (ref 3.5–5.1)
PROT SERPL-MCNC: 8.7 GM/DL (ref 5.8–7.6)
PROT UR STRIP-MCNC: <6.8 MG/DL
PROT/CREAT UR-RTO: ABNORMAL MG/GM
RBC # BLD AUTO: 4.17 X10(6)/MCL (ref 4–5.2)
SODIUM SERPL-SCNC: 133 MMOL/L (ref 136–145)
TIBC SERPL-MCNC: 229 UG/DL (ref 70–310)
TIBC SERPL-MCNC: 319 UG/DL (ref 250–450)
TRANSFERRIN SERPL-MCNC: 312 MG/DL (ref 173–360)
WBC # SPEC AUTO: 7.3 X10(3)/MCL (ref 4.5–11)

## 2020-12-10 ENCOUNTER — HISTORICAL (OUTPATIENT)
Dept: LAB | Facility: HOSPITAL | Age: 65
End: 2020-12-10

## 2020-12-10 LAB
T4 FREE SERPL-MCNC: 1.05 NG/DL (ref 0.7–1.48)
TSH SERPL-ACNC: 0.18 UIU/ML (ref 0.35–4.94)

## 2021-04-05 ENCOUNTER — HISTORICAL (OUTPATIENT)
Dept: ADMINISTRATIVE | Facility: HOSPITAL | Age: 66
End: 2021-04-05

## 2021-04-05 LAB
ALBUMIN SERPL-MCNC: 3.8 GM/DL (ref 3.4–4.8)
ALBUMIN/GLOB SERPL: 1 RATIO (ref 1.1–2)
ALP SERPL-CCNC: 210 UNIT/L (ref 40–150)
ALT SERPL-CCNC: 29 UNIT/L (ref 0–55)
AST SERPL-CCNC: 59 UNIT/L (ref 5–34)
BILIRUB SERPL-MCNC: 0.3 MG/DL
BILIRUBIN DIRECT+TOT PNL SERPL-MCNC: 0.1 MG/DL (ref 0–0.5)
BILIRUBIN DIRECT+TOT PNL SERPL-MCNC: 0.2 MG/DL (ref 0–0.8)
BUN SERPL-MCNC: 6.8 MG/DL (ref 9.8–20.1)
CALCIUM SERPL-MCNC: 9.3 MG/DL (ref 8.4–10.2)
CHLORIDE SERPL-SCNC: 91 MMOL/L (ref 98–107)
CO2 SERPL-SCNC: 31 MMOL/L (ref 23–31)
CREAT SERPL-MCNC: 0.8 MG/DL (ref 0.55–1.02)
GLOBULIN SER-MCNC: 3.9 GM/DL (ref 2.4–3.5)
GLUCOSE SERPL-MCNC: 62 MG/DL (ref 82–115)
POTASSIUM SERPL-SCNC: 4.4 MMOL/L (ref 3.5–5.1)
PROT SERPL-MCNC: 7.7 GM/DL (ref 5.8–7.6)
SODIUM SERPL-SCNC: 132 MMOL/L (ref 136–145)
T3FREE SERPL-MCNC: 1.86 PG/ML (ref 1.58–3.91)
T4 FREE SERPL-MCNC: 0.98 NG/DL (ref 0.7–1.48)
TSH SERPL-ACNC: 7.61 UIU/ML (ref 0.35–4.94)

## 2021-04-07 ENCOUNTER — HISTORICAL (OUTPATIENT)
Dept: INFUSION THERAPY | Facility: HOSPITAL | Age: 66
End: 2021-04-07

## 2021-04-27 ENCOUNTER — HISTORICAL (OUTPATIENT)
Dept: ADMINISTRATIVE | Facility: HOSPITAL | Age: 66
End: 2021-04-27

## 2021-04-27 LAB
ABS NEUT (OLG): 4.59 X10(3)/MCL (ref 2.1–9.2)
BASOPHILS # BLD AUTO: 0.1 X10(3)/MCL (ref 0–0.2)
BASOPHILS NFR BLD AUTO: 1 %
DEPRECATED CALCIDIOL+CALCIFEROL SERPL-MC: 94.6 NG/ML (ref 30–80)
EOSINOPHIL # BLD AUTO: 0.3 X10(3)/MCL (ref 0–0.9)
EOSINOPHIL NFR BLD AUTO: 4 %
ERYTHROCYTE [DISTWIDTH] IN BLOOD BY AUTOMATED COUNT: 13.4 % (ref 11.5–17)
FERRITIN SERPL-MCNC: 41.91 NG/ML (ref 4.63–204)
HCT VFR BLD AUTO: 38.9 % (ref 37–47)
HGB BLD-MCNC: 12.8 GM/DL (ref 12–16)
IRON SATN MFR SERPL: 14 % (ref 20–50)
IRON SERPL-MCNC: 44 UG/DL (ref 50–170)
LYMPHOCYTES # BLD AUTO: 1.1 X10(3)/MCL (ref 0.6–4.6)
LYMPHOCYTES NFR BLD AUTO: 16 %
MCH RBC QN AUTO: 32 PG (ref 27–31)
MCHC RBC AUTO-ENTMCNC: 32.9 GM/DL (ref 33–36)
MCV RBC AUTO: 97.3 FL (ref 80–94)
MONOCYTES # BLD AUTO: 0.8 X10(3)/MCL (ref 0.1–1.3)
MONOCYTES NFR BLD AUTO: 12 %
NEUTROPHILS # BLD AUTO: 4.59 X10(3)/MCL (ref 2.1–9.2)
NEUTROPHILS NFR BLD AUTO: 67 %
PLATELET # BLD AUTO: 332 X10(3)/MCL (ref 130–400)
PMV BLD AUTO: 8.9 FL (ref 9.4–12.4)
RBC # BLD AUTO: 4 X10(6)/MCL (ref 4.2–5.4)
TIBC SERPL-MCNC: 272 UG/DL (ref 70–310)
TIBC SERPL-MCNC: 316 UG/DL (ref 250–450)
TRANSFERRIN SERPL-MCNC: 298 MG/DL (ref 173–360)
WBC # SPEC AUTO: 6.8 X10(3)/MCL (ref 4.5–11.5)

## 2021-06-16 ENCOUNTER — HISTORICAL (OUTPATIENT)
Dept: ADMINISTRATIVE | Facility: HOSPITAL | Age: 66
End: 2021-06-16

## 2021-06-16 LAB — TSH SERPL-ACNC: 0.15 UIU/ML (ref 0.35–4.94)

## 2021-08-20 ENCOUNTER — HISTORICAL (OUTPATIENT)
Dept: HEMATOLOGY/ONCOLOGY | Facility: CLINIC | Age: 66
End: 2021-08-20

## 2021-08-20 LAB
ABS NEUT (OLG): 4.48 X10(3)/MCL (ref 2.1–9.2)
ALBUMIN SERPL-MCNC: 3.5 GM/DL (ref 3.4–4.8)
ALBUMIN/GLOB SERPL: 0.9 RATIO (ref 1.1–2)
ALP SERPL-CCNC: 139 UNIT/L (ref 40–150)
ALT SERPL-CCNC: 25 UNIT/L (ref 0–55)
AST SERPL-CCNC: 30 UNIT/L (ref 5–34)
BASOPHILS # BLD AUTO: 0 X10(3)/MCL (ref 0–0.2)
BASOPHILS NFR BLD AUTO: 0.6 %
BILIRUB SERPL-MCNC: 0.3 MG/DL
BILIRUBIN DIRECT+TOT PNL SERPL-MCNC: 0.1 MG/DL (ref 0–0.8)
BILIRUBIN DIRECT+TOT PNL SERPL-MCNC: 0.2 MG/DL (ref 0–0.5)
BUN SERPL-MCNC: 4.5 MG/DL (ref 9.8–20.1)
CALCIUM SERPL-MCNC: 9 MG/DL (ref 8.4–10.2)
CHLORIDE SERPL-SCNC: 91 MMOL/L (ref 98–107)
CO2 SERPL-SCNC: 35 MMOL/L (ref 23–31)
CREAT SERPL-MCNC: 0.76 MG/DL (ref 0.55–1.02)
EOSINOPHIL # BLD AUTO: 0.1 X10(3)/MCL (ref 0–0.9)
EOSINOPHIL NFR BLD AUTO: 1.7 %
ERYTHROCYTE [DISTWIDTH] IN BLOOD BY AUTOMATED COUNT: 13.3 % (ref 11.5–17)
FERRITIN SERPL-MCNC: 57.15 NG/ML (ref 4.63–204)
GLOBULIN SER-MCNC: 4 GM/DL (ref 2.4–3.5)
GLUCOSE SERPL-MCNC: 82 MG/DL (ref 82–115)
HCT VFR BLD AUTO: 39.8 % (ref 37–47)
HGB BLD-MCNC: 12.7 GM/DL (ref 12–16)
IRON SATN MFR SERPL: 21 % (ref 20–50)
IRON SERPL-MCNC: 69 UG/DL (ref 50–170)
LYMPHOCYTES # BLD AUTO: 1 X10(3)/MCL (ref 0.6–4.6)
LYMPHOCYTES NFR BLD AUTO: 15.3 %
MCH RBC QN AUTO: 30.9 PG (ref 27–31)
MCHC RBC AUTO-ENTMCNC: 31.9 GM/DL (ref 33–36)
MCV RBC AUTO: 96.8 FL (ref 80–94)
MONOCYTES # BLD AUTO: 0.7 X10(3)/MCL (ref 0.1–1.3)
MONOCYTES NFR BLD AUTO: 11.1 %
NEUTROPHILS # BLD AUTO: 4.5 X10(3)/MCL (ref 2.1–9.2)
NEUTROPHILS NFR BLD AUTO: 71 %
PLATELET # BLD AUTO: 330 X10(3)/MCL (ref 130–400)
PMV BLD AUTO: 8 FL (ref 9.4–12.4)
POTASSIUM SERPL-SCNC: 3.8 MMOL/L (ref 3.5–5.1)
PROT SERPL-MCNC: 7.5 GM/DL (ref 5.8–7.6)
RBC # BLD AUTO: 4.11 X10(6)/MCL (ref 4.2–5.4)
SODIUM SERPL-SCNC: 135 MMOL/L (ref 136–145)
TIBC SERPL-MCNC: 254 UG/DL (ref 70–310)
TIBC SERPL-MCNC: 323 UG/DL (ref 250–450)
TRANSFERRIN SERPL-MCNC: 321 MG/DL (ref 173–360)
WBC # SPEC AUTO: 6.3 X10(3)/MCL (ref 4.5–11.5)

## 2021-09-20 ENCOUNTER — HISTORICAL (OUTPATIENT)
Dept: ADMINISTRATIVE | Facility: HOSPITAL | Age: 66
End: 2021-09-20

## 2021-09-20 LAB
ALBUMIN SERPL-MCNC: 3.2 GM/DL (ref 3.4–4.8)
ALBUMIN/GLOB SERPL: 0.8 RATIO (ref 1.1–2)
ALP SERPL-CCNC: 169 UNIT/L (ref 40–150)
ALT SERPL-CCNC: 29 UNIT/L (ref 0–55)
AST SERPL-CCNC: 44 UNIT/L (ref 5–34)
BILIRUB SERPL-MCNC: 0.3 MG/DL
BILIRUBIN DIRECT+TOT PNL SERPL-MCNC: 0.1 MG/DL (ref 0–0.5)
BILIRUBIN DIRECT+TOT PNL SERPL-MCNC: 0.2 MG/DL (ref 0–0.8)
BUN SERPL-MCNC: 7.7 MG/DL (ref 9.8–20.1)
CALCIUM SERPL-MCNC: 9.6 MG/DL (ref 8.4–10.2)
CHLORIDE SERPL-SCNC: 88 MMOL/L (ref 98–107)
CO2 SERPL-SCNC: 32 MMOL/L (ref 23–31)
CREAT SERPL-MCNC: 0.75 MG/DL (ref 0.55–1.02)
CRP SERPL-MCNC: 2.34 MG/DL
ERYTHROCYTE [DISTWIDTH] IN BLOOD BY AUTOMATED COUNT: 13.5 % (ref 11.5–17)
ERYTHROCYTE [SEDIMENTATION RATE] IN BLOOD: 56 MM/HR (ref 0–20)
GLOBULIN SER-MCNC: 4 GM/DL (ref 2.4–3.5)
GLUCOSE SERPL-MCNC: 85 MG/DL (ref 82–115)
HCT VFR BLD AUTO: 38.3 % (ref 37–47)
HGB BLD-MCNC: 12.4 GM/DL (ref 12–16)
MCH RBC QN AUTO: 31.3 PG (ref 27–31)
MCHC RBC AUTO-ENTMCNC: 32.4 GM/DL (ref 33–36)
MCV RBC AUTO: 96.7 FL (ref 80–94)
PLATELET # BLD AUTO: 343 X10(3)/MCL (ref 130–400)
PMV BLD AUTO: 8.9 FL (ref 9.4–12.4)
POTASSIUM SERPL-SCNC: 4.2 MMOL/L (ref 3.5–5.1)
PROT SERPL-MCNC: 7.2 GM/DL (ref 5.8–7.6)
RBC # BLD AUTO: 3.96 X10(6)/MCL (ref 4.2–5.4)
SODIUM SERPL-SCNC: 133 MMOL/L (ref 136–145)
WBC # SPEC AUTO: 7.4 X10(3)/MCL (ref 4.5–11.5)

## 2022-04-11 ENCOUNTER — HISTORICAL (OUTPATIENT)
Dept: ADMINISTRATIVE | Facility: HOSPITAL | Age: 67
End: 2022-04-11

## 2022-04-29 VITALS
WEIGHT: 78.5 LBS | DIASTOLIC BLOOD PRESSURE: 78 MMHG | WEIGHT: 87.75 LBS | HEIGHT: 60 IN | DIASTOLIC BLOOD PRESSURE: 68 MMHG | SYSTOLIC BLOOD PRESSURE: 99 MMHG | HEIGHT: 55 IN | OXYGEN SATURATION: 96 % | BODY MASS INDEX: 15.41 KG/M2 | BODY MASS INDEX: 20.31 KG/M2 | SYSTOLIC BLOOD PRESSURE: 118 MMHG

## 2022-04-29 NOTE — OP NOTE
DATE OF SURGERY:    01/11/2018    SURGEON:  Thelma Beck Jr., MD    PROCEDURE:  Fiberoptic bronchoscopy.    PREOPERATIVE DIAGNOSIS:  Esophageal cancer, needs fiberoptic bronchoscopy to rule out possibility of tracheal involvement.    POSTOPERATIVE DIAGNOSIS:  No evidence of tracheal involvement of tumor or TE fistula.    ESTIMATED BLOOD LOSS:  None.    ANESTHESIA:  Versed and fentanyl IV push and endobronchial Xylocaine.    PROCEDURE IN DETAIL:  The patient was placed in a supine position and fiberoptic bronchoscope was passed per left naris.  Vocal cords appears asymmetric with thickening of the arytenoid.  She was noted to have normal trachea with no evidence of mass/tumor or tracheoesophageal fistula.  Fartun sharp.  Airways were patent to all subsegmental bronchi without extrinsic compression or abnormal mucosa.  She tolerated the procedure well.  No hypotension or arrhythmias.  She will be observed by postanesthesia recovery criteria for conscious sedation and discharged to home after.        ______________________________  Thelma Beck Jr., MD JR/CS  DD:  01/11/2018  Time:  01:06PM  DT:  01/12/2018  Time:  10:16AM  Job #:  [id number]

## 2022-04-29 NOTE — OP NOTE
Patient:   Miley York            MRN: [id number]            FIN: [id number]               Age:   62 years     Sex:  Female     :  1955   Associated Diagnoses:   None   Author:   Ashwin Werner MD      Procedure   Esophagogastroduodenoscopy procedure   Physical Exam: vital signs Vital Signs   12/15/2017 11:17 CST     Temperature Oral          36.7 DegC                             Temperature Oral (calculated)             98.06 DegF                             Heart Rate Monitored      86 bpm                             Respiratory Rate          16 br/min                             SpO2                      97 %                             Oxygen Therapy            Room air                             Systolic Blood Pressure   145 mmHg  HI                             Diastolic Blood Pressure  99 mmHg  HI                             Mean Arterial Pressure, Cuff              114 mmHg    2017 14:25 CST     Temperature Oral          97.4 degF                             Temperature Oral (calculated)             207.32 DegF                             Peripheral Pulse Rate     87 bpm                             Respiratory Rate          20 br/min                             Systolic Blood Pressure   101 mmHg                             Diastolic Blood Pressure  69 mmHg     .        Impression and Plan   DATE OF PROCEDURE:  12/15/17    PATIENT NAME: Miley York    MRN: [id number]  PREOPERATIVE DIAGNOSIS: Hx laryngeal and tongue squamous cell carcinoma with PET avid esophageal lesion   POSTOPERATIVE DIAGNOSIS:  Same + esophageal lesion 1.5cm, deep prepyloric ulcer, gastritis  PROCEDURE PERFORMED:  Esophagogastroduodenoscopy   ENDOSCOPIST:  Celso Yeung MD  ASSISTANT:  Ashwin Werner MD  ANESTHESIA:  Deep Sedation  EBL: none  EXTENT OF EXAM:  To 3rd part duodenum  LIMITATIONS:  None.  INDICATIONS FOR EXAMINATION:  The patient is a 63yo F Hx laryngeal and tongue squamous cell carcinoma with PET avid  esophageal lesion. Here for EGD for direct visualization of PET lesion.  PROCEDURE IN DETAIL:  A physical examination was performed. The major risks and benefits associated with the procedure were explained to the patient in detail. The patient verbalized understanding and agreement with the same. Informed consent was obtained.  The patient was connected to the appropriate monitoring devices and an IV was started. Continuous oxygen was provided via nasal cannula and intravenous sedation was administered in divided doses throughout the procedure.   The patient was then placed in the left lateral decubitus position. The endoscope was then advanced under direct visualization over the tongue, the esophagus, stomach and duodenum. It was slowly withdrawn and the mucosa was carefully evaluated. Duodenal mucosal abnormalities were not visualized. Antegrade and retrograde views of the stomach did demonstrate A gastrostomy tube in place and some mild non-erosive gastritis, predominantly in the antrum with a deep 2cm benign appearing prepyloric ulcer on the anterior surface of the stomach. Cold forceps antral biopsy was obtained for tissue urease test. Retroflexed view demonstrated no signs of a gastric varix, no coffee grounds or red blood were seen in the gastric lumen. The scope was then withdrawn through the GE junction and careful examination showed miniimal esophagitis at the GE jxn. Careful examination of the remainder of the esophagus showed a 1.5cm esophageal lesion at 25cm, cold forceps biopsy were used to obtain biopsies. Suspicious for malignancy. The scope was then withdrawn from the patient and the procedure terminated. It was well tolerated and there were no immediate complications.   ENDOSCOPIC DIAGNOSIS:   esophageal lesion 1.5cm, suspicious for malignancy, esophagitis, deep prepyloric ulcer, antral gastritis  RECOMMENDATIONS:  Follow up in 2 weeks for biopsy results. PPI therapy.

## 2022-04-29 NOTE — OP NOTE
DATE OF SURGERY:    05/09/2018    ATTENDING PHYSICIAN:  Moshe Beckford MD    RESIDENT:  Meka Holloway MD    PREOPERATIVE DIAGNOSES:    1. Esophageal squamous cell carcinoma.  2. History of peptic ulcer disease.    POSTOPERATIVE DIAGNOSES:    1. Esophageal squamous cell carcinoma.  2. History of peptic ulcer disease.    PROCEDURE PERFORMED:  Esophagogastroduodenoscopy with biopsies.    ANESTHESIA:  Sedation with propofol.    INDICATIONS FOR PROCEDURE:  Patient is a 62-year-old female with a history of oropharyngeal cancer who completed treatment over a year ago.  When she had a surveillance PET scan done in December, she was noted to have a hypermetabolic lesion in her mid esophagus.  She got an EGD where an ulcerated mass in the midesophagus was seen.  Biopsies were taken, and this was proven to be squamous cell carcinoma.  She underwent EUS, which staged her as a T2 N0 M0.  She completed chemo and radiation therapy approximately 6 weeks ago.  She was referred to [facility] for consideration of an esophagectomy.  Surgeons referred her to GI clinic for repeat upper endoscopy to assess the gross appearance of the esophageal lesion.  She is also scheduled to undergo a repeat PET scan at the end of this month to determine whether or not she will require resection.    PROCEDURE IN DETAIL:  After appropriate informed consent had been obtained and all questions had been answered, the patient was brought to the procedure room.  She was connected to the appropriate monitoring devices.  Continuous oxygen was administered to the patient via nasal cannula.  A time-out was performed verifying correct patient and procedure.  Patient was then placed in left lateral decubitus position.  A bite block was placed.  IV sedation was administered by the anesthesia team in divided dosages throughout the procedure.  After appropriate level of sedation had been achieved, the procedure was begun.  A well-lubricated endoscope was inserted  into the mouth and advanced over the tongue and through the esophagus, stomach, and duodenum under direct visualization.  On initial look at the esophagus, no large mass was seen.  The scope was advanced to the second portion of the duodenum where the ampulla was visualized.  No mucosal abnormalities of the duodenum were seen.  The scope was then retracted through the pylorus and into the stomach.  The patient was noted to have an area of hypertrophic mucosa in the stomach antrum, likely from a healed ulcer.  Biopsies of this area were taken using cold forceps x2.  The rest of the stomach was then inspected including retroflexion.  No additional abnormalities were seen.  The scope was then withdrawn through the GE junction and into the esophagus.  Approximately 25 cm from the incisors there was an area of mucosal sloughing.  This is likely the site of her carcinoma, but it appeared to be grossly absent.  Biopsies of this area were also taken using cold forceps x2.  No other masses or abnormalities of the esophagus were seen.  The scope was then fully withdrawn and the procedure complete.  The patient was awakened from anesthesia and transferred to the recovery room in stable condition.    COMPLICATIONS:  None.    ESTIMATED BLOOD LOSS:  Minimal.    SPECIMENS:    1. Stomach antrum biopsy x2.  2. Area of mucosal sloughing at the mid esophagus 25 cm from the incisors biopsied x2.    FOLLOWUP:  Patient has a PET scan scheduled for 05/28.  After this, she will follow up with her oncologist for further discussions of care.        ______________________________  JOSHUA WILSON MD    ______________________________  MD KAYLEEN Lerma/ÁNGELA  DD:  05/09/2018  Time:  03:35PM  DT:  05/09/2018  Time:  04:10PM  Job #:  [id number]

## 2022-04-29 NOTE — PROGRESS NOTES
Patient:   Miley York            MRN: [id number]            FIN: [id number]               Age:   61 years     Sex:  Female     :  1955   Associated Diagnoses:   None   Author:   Adina VANEGAS, Erica Lynn      Visit Information     Radiation Oncologist: Dr. Bonilla  ENT: Dr. Ovalles    Problem list  1.  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  Diagnosed 5/15/2017  2.  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     Pathology: poorly differentiated squamous cell carcinoma measuring 3.2 cm with close margins: anterior 3 mm and deep margin 5 mm.  Additional margins were obtained and final assessment                        was 6 mm from the anterior and 6 mm from deep margins.  Depth of invasion was 7 mm and there was perineural invasion.    HPI/Clinical History:    Patient was referred to [facility] oncology clinic for biopsy confirmed right aryepiglottic and tongue  squamous cell carcinoma.  She reports having a left lateral tongue lesion and sore throat since 2016.  She had numerous visits to her physician as well as her dentist and was treated with antibiotics which ultimately led to further workup.  She's had associated 20 pound weight loss, trouble swallowing and progressive pain to her tongue while eating and talking.  She was found to have a 3 cm indurated, ulcerated lesion on the left posterior lateral tongue, as well as multiple bilateral neck nodes.  Direct laryngoscopy, flexible bronchoscopy and esophagoscopy showed an ulcerative lesion involving the right aryetenoid and aryepiglottic fold, extending to the postcricoid area.         Interval  "History    Patient presents today for routine follow-up.  She is undergoing radiation and high-dose cisplatin (day 1, 22, 43) with next cisplatin due 8/23/17. Today she complains of feeling "hot" in her neck, abdomen, and with urination. She states she feels as if she has a decreased urinary output. She also reports intermittent tinnitus and decreased hearing. Most of nutrition at this time is via tube feedings. Her weight is stable. She complains of dyspnea of exertion with productive cough with white sputum, and increased oral secretions. She denies hemoptysis. She denies fever or chills. She reports experiencing increased nausea and vomited only twice. She reports taking naproxen twice daily for lower back pain. Instructed patient to stop all NSAIDS and will replace with tramadol while on treatment (she is refusing narcotics).      Chief Complaint   burning on urination and incontinence      Histories   Past Medical History:    Active  Depression ([id number])  Resolved  divdrtiSchizophrenia ([id number]):  Resolved.  Diverticulitis ([id number]):  Resolved., 1.  Schizophrenia  2 depression  3 diverticulitis  4 hepatomegaly   Family History:    Mental illness  Mother     Procedure history:    Gastrostomy (None) on 6/2/2017 at 61 Years.  Comments:  6/2/2017 13:Harriet Vanessa RN  auto-populated from documented surgical case  Laryngoscopy (None) on 6/2/2017 at 61 Years.  Comments:  6/2/2017 13:Harriet Vanessa RN  auto-populated from documented surgical case  Skin Graft Split Thickness (None) on 6/2/2017 at 61 Years.  Comments:  6/2/2017 13:Harriet Vanessa RN  auto-populated from documented surgical case  Glossectomy Partial (Left) on 6/2/2017 at 61 Years.  Comments:  6/2/2017 13:Harriet Vanessa RN  auto-populated from documented surgical case  Dental Extraction on 6/2/2017 at 61 Years.  Comments:  6/4/2017 14:36 - Harriet Quintanilla RN  auto-populated from documented surgical " case  Panendoscopy (None) on 5/15/2017 at 61 Years.  Comments:  5/15/2017 11:Phuong Watters RN  auto-populated from documented surgical case  Laryngoscopy (None) on 5/15/2017 at 61 Years.  Comments:  5/15/2017 11:Phuong Watters RN  auto-populated from documented surgical case  Bronchoscopy, Diagnostic (None) on 5/15/2017 at 61 Years.  Comments:  5/15/2017 11:Phuong Watters RN  auto-populated from documented surgical case  Esophagoscopy (None) on 5/15/2017 at 61 Years.  Comments:  5/15/2017 11:Phuong Watters RN  auto-populated from documented surgical case  Biopsy (ENT) (None) on 5/15/2017 at 61 Years.  Comments:  5/15/2017 11:Phuong Watters RN  auto-populated from documented surgical case  Hernia Repair Incisional Laparoscopic on 3/2/2016 at 60 Years.  Comments:  3/2/2016 11:Leann - Krystal Schneider RN  auto-populated from documented surgical case  Tonsillectomy ([id number]).   section ([id number]).  Hemorrhoidectomy ([id number]).  colon resection.   Social History        Social & Psychosocial Habits    Alcohol  2015 Risk Assessment: Low Risk    2016  Use: Current    Type: Liquor, Wine    Frequency: Daily    Comment: 1 drink q evening - 2016 13:24 - Haley Laughlin LPN    2017  Use: Current    Type: Wine    Frequency: 1-2 times per week    Comment: on too much meds to drink wine currently - 2017 13:16 - Ariel Werner RN    Employment/School  2016  Status: Retired    Highest education: Some college    Exercise  2017  Duration (average number of minutes): 0    Home/Environment  2016  Lives with: Significant other    Living situation: Home/Independent    Home equipment: BP moniter, Walker/Cane    Nutrition/Health  2017  Diet description: Jevity 1.2 per peg QID    Wants to lose weight: No    Sleeping concerns: No    Feels highly stressed: No    Other    Comment: denies any falls over the past 3 months -  08/11/2016 09:21 - Alyssa Shaw LPN    Sexual    Comment: confidential - 08/11/2016 09:21 - Alyssa Shaw LPN    Substance Abuse  03/08/2015 Risk Assessment: Denies Substance Abuse    01/26/2016  Use: Past    Type: Marijuana    Tobacco  01/26/2016  Use: Current every day smoker    Type: Cigarettes    08/11/2016  Use: Former smoker    Type: Cigarettes    Comment: quit 07/01/2016 - 08/11/2016 09:20 - Alyssa Shaw LPN    05/12/2017  Use: Current every day smoker    Tobacco use per day: 30    06/21/2017  Use: Former smoker    Started at age: 23.0 Years    Previous treatment: Nicotine replacement    Ready to change: Yes    Concerns about tobacco use in household: No    07/06/2017  Use: Former smoker    Type: Cigarettes    Comment: quit 6/1/2017 - 07/06/2017 08:24 - Eli Griffin LPN  .        Review of Systems   12 point review of systems done in full with pertinent positives as described in interval history. Remainder of review of systems unremarkable.      Health Status   Allergies:    Allergic Reactions (Selected)  Severity Not Documented  Codeine- No reactions were documented.,    Allergies (1) Active Reaction  codeine None Documented     Current medications:  (Selected)   Outpatient Medications  Ordered  Benadryl 50mg/ml Inj: 25 mg, 0.5 mL, 150 mL/hr, IV Piggyback, Once-chemo, Days 1, 22, 43  CCA Normal Saline (0.9% NS) - 1000 mL: 1,000 mL, 500 mL/hr, IV Piggyback, Once-chemo, Days 1, 22, 43  Heparin Flush 100 U/mL - 5 mL: 500 units, 5 mL, IV Push, Once-chemo, Days 1, 22, 43  cisplatin (for IVPB): 140 mg, 140 mL, 488.57 mL/hr, IV Piggyback, Once-chemo, Days 1, 22, 43  dexamethasone (for IVPB): 10 mg, 1 mL, 153 mL/hr, IV Piggyback, Once-chemo, Days 1, 22, 43  fosaprepitant (for IVPB): 150 mg, 1 EA, 500 mL/hr, IV Piggyback, Once-chemo, Days 1, 22, 43  ondansetron (for IVPB): 16 mg, 8 mL, 150 mL/hr, IV Piggyback, Once-chemo, Days 1, 22, 43  potassium chloride (add. for IVPB) 20 mEq + magnesium sulfate  (for IVPB) 2 gm + Sodium Chloride 0.9...: 20 mEq, 100 mL, 575 mL/hr, IV Piggyback, Once-chemo, Days 1, 22, 43  Completed  lidocaine 1% injectable solution: 5 mL, Subcutaneous, Once  Discontinued  Afrin 0.05% nasal spray: 2 spray(s), Nasal, BID  Ativan 1 mg oral tablet: 0.5 mg, 0.5 tab(s), PEG Tube, q8hr, PRN: anxiety  Compazine: 50 mg, 5 tab(s), Oral, At Bedtime  D5W1/2NS 1000 with Kcl 20meq 1,000 mL: 40 mL/hr, IV  Dextrose 50% and Water  (50 mL vial/syringe): 12.5 gm, 25 mL, IV Push, As Directed, PRN: blood glucose  Dextrose 50% and Water  (50 mL vial/syringe): 12.5 gm, 25 mL, IV Push, Once, PRN: blood glucose  Dextrose 50% and Water  (50 mL vial/syringe): 25 gm, 50 mL, IV Push, As Directed, PRN: blood glucose  Dextrose 50% in Water: 12.5 gm, 25 mL, IV Push, As Directed, PRN: blood glucose  DuoNeb 0.5 mg-2.5 mg/3 mL inhalation solution: 3 mL, NEB, q6hr, PRN: wheezing  Lovenox: 40 mg, 0.4 mL, Subcutaneous, Daily  Peridex 0.12% topical liquid: 15 mL, Oral, QID  Protonix: 40 mg, 1 EA, IV Slow, Daily  Sodium Chloride 0.9% PF vial (For PICC Flush): 10 mL, IV Push, As Directed, PRN: other (see comment)  Sodium Chloride 0.9% PF vial (For PICC Flush): 10 mL, IV Push, BID  Sodium Chloride 0.9% PF vial (For PICC Flush): 20 mL, IV Push, As Directed, PRN: other (see comment)  Zofran: 4 mg, 2 mL, IV Push, q4hr, PRN: nausea  bisacodyl 10 mg RECTAL suppository: 10 mg, 1 supp, WV (rectal), Daily, PRN: other (see comment)  docusate sodium 10 mg/mL oral liquid: 100 mg, 10 mL, GTUBE, BID  glucagon recombinant 1 mg injection: 1 mg, 1 EA, IM, q10min, PRN: blood glucose  glucagon recombinant 1 mg injection: 1 mg, 1 EA, IM, q10min, PRN: blood glucose  glucose 40% oral gel: 15 gm, 0.5 tube(s), Oral, As Directed, PRN: blood glucose  ibuprofen: 400 mg, 1 tab(s), PEG Tube, q6hr, PRN: pain  insulin lispro 100 units/mL subcutaneous injection: 2-14 units, Subcutaneous, As Directed, PRN: blood glucose  nicotine 14 mg/24 hr transdermal film,  extended release: 14 mg, 1 patch(es), TOP, Daily  oxazepam 15 mg oral capsule: 15 mg, 1 cap(s), Oral, BID  oxyCODONE: 5 mg, 1 tab(s), GTUBE, q4hr, PRN: pain  traZODone: 100 mg, 2 tab(s), Oral, Once a day (at bedtime)  Prescriptions  Prescribed  Peridex 0.12% mucous membrane liquid: 0.012 gm, 10 mL, Oral, TID, for 30 day(s), (swish and spit; do not swallow), 900 mL, 3 Refill(s)  Peridex 0.12% mucous membrane liquid: 0.018 gm, 15 mL, Oral, BID, for 14 day(s), (swish and spit; do not swallow), 480 mL, 0 Refill(s)  Rolling walker: See Instructions, Please provide patient with rolling walker.   Length of need: lifetime., 1 units, 0 Refill(s)  Suction: See Instructions, Suction machine (#1), tubing (#10), cannister(#10), yankauer suction (#30), 1 EA, 0 Refill(s)  Tegaderm (or equivalent): See Instructions, Please supply patient with tegaderm or equivalent transparent adhesive dressing, approximately 3x5 inch size., 20 units, 1 Refill(s)  Tube feeding: See Instructions, G-tube feeding kit (syringe)., 30 EA, 0 Refill(s)  Tube feeds: See Instructions, Jevity 1.2 6 cans daily for 30 days., 180 EA, 12 Refill(s)  docusate sodium 50 mg/15 mL oral syrup: 100 mg, 30 mL, GTUBE, BID, for 30 day(s), PRN: as needed for constipation, 500 mL, 11 Refill(s)  nicotine 14 mg/24 hr transdermal film, extended release: 1 patch(es), TOP, Daily, for 30 day(s), 30 patch(es), 11 Refill(s)  oxyCODONE 5 mg oral capsule: See Instructions, 1/2 to 1 cap(s) Oral q6hr, PRN: for pain, 60 cap(s), 0 Refill(s)  potassium chloride 20 mEq/15 mL oral liquid: 20 mEq, 15 mL, GTUBE, Daily, for 30 day(s), 450 mL, 11 Refill(s)  traMADol 50 mg oral tablet: 50 mg, 1 tab(s), Oral, q4hr, PRN: for pain, 60 tab(s), 1 Refill(s)  Completed  Anaprox- mg oral tablet: 550 mg, 1 tab(s), Oral, BID, for 30 day(s), PRN: for pain, 60 tab(s), 0 Refill(s)  Ativan 0.5 mg oral tablet: 0.5 mg, 1 tab(s), GTUBE, BID, for 30 day(s), 60 tab(s), 0 Refill(s)  Compazine 5 mg tab: 50 mg,  GTUBE, At Bedtime, for 30 day(s), 300 tab(s), 1 Refill(s)  oxazepam 15 mg oral capsule: 15 mg, 1 cap(s), GTUBE, Daily, for 5 day(s), for alcohol withdraw taper, 5 cap(s), 0 Refill(s)  Discontinued  Anaprox- mg oral tablet: 550 mg, 1 tab(s), Oral, BID, PRN: for pain, 60 tab(s), 0 Refill(s)  alendronate 70 mg oral tablet: 70 mg, 1 tab(s), Oral, qWeek, 12 tab(s), 3 Refill(s)  Documented Medications  Documented  Compazine: 50 mg, 5 tab(s), Oral, At Bedtime, 0 Refill(s)  TRAZODONE 100 MG TABLET:   Discontinued  NAPROXEN SODIUM 550 MG TAB: 550 mg, 1 tab(s), Oral, BID      Physical Examination   Vital Signs   8/8/2017 8:45 CDT        Temperature Oral          36.8 DegC                             Peripheral Pulse Rate     86 bpm                             Respiratory Rate          20 br/min                             SpO2                      100 %                             Systolic Blood Pressure   98 mmHg                             Diastolic Blood Pressure  67 mmHg     General:  Alert and oriented, Mild distress.         Appearance: Underweight.         Behavior: Appropriate.    Eye:  Pupils are equal, round and reactive to light, Extraocular movements are intact, Normal conjunctiva.    HENT:       Mouth: Tongue, Teeth ( Missing ), increased oral secretions.    Neck:  Supple, erythematous, no desquamation secondary to radiation changes.         Lymph nodes: Left, Submandibular.    Respiratory:  Respirations are non-labored, Symmetrical chest wall expansion, No chest wall tenderness.         Breath sounds: Bilateral, Rhonchi present.    Cardiovascular:  Normal rate, Regular rhythm, No murmur, Normal peripheral perfusion, No edema.    Gastrointestinal:  Soft, Non-tender, Non-distended.         Support: Peg.    Genitourinary:  No costovertebral angle tenderness.    Musculoskeletal:  Normal range of motion, Normal strength, Normal gait.         Spine/torso exam: Thoracic ( Kyphosis ).    Integumentary:  Warm, Dry,  Alopecia.         Integumentary exam: Bilateral, Arm, Ecchymosis.    Neurologic:  No focal deficits, Cranial Nerves II-XII are grossly intact.         Orientation: Oriented X 4.    Cognition and Speech:  Oriented,      Articulation and speech: Fair articulation.    Psychiatric:  Cooperative, Appropriate mood & affect.    ECOG Performance Scale: 2 - Capable of all self-care but unable to carry out any work activities. Up and about greater than 50 percent of waking hours.      Review / Management   Results review   Laboratory Results   Today's Lab Results : PowerNote Discrete Results   8/8/2017 8:40 CDT        WBC                       6.9 x10(3)/mcL                             RBC                       3.26 x10(6)/mcL  LOW                             Hgb                       10.0 gm/dL  LOW                             Hct                       30.1 %  LOW                             Platelet                  338 x10(3)/mcL                             MCV                       92.3 fL                             MCH                       30.7 pg                             MCHC                      33.2 gm/dL                             RDW                       13.8 %                             MPV                       9.6 fL                             Abs Neut                  5.25 x10(3)/mcL                             Neutro Auto               76 x10(3)/mcL  NA                             Lymph Auto                8 %  LOW                             Mono Auto                 13 %  HI                             Eos Auto                  1 %                             Abs Eos                   0.07 x10(3)/mcL  NA                             Basophil Auto             1 %                             Abs Neutro                5.25 x10(3)/mcL  NA                             Abs Lymph                 0.58 x10(3)/mcL  NA                             Abs Mono                  0.91 x10(3)/mcL  NA                              Abs Baso                  0.07 x10(3)/mcL  NA                             IG%                       1 %  NA                             IG#                       0.0400  NA                             Sodium Lvl                135 mmol/L  LOW                             Potassium Lvl             4.0 mmol/L                             Chloride                  99 mmol/L                             CO2                       29 mmol/L                             Calcium Lvl               9.1 mg/dL                             Magnesium Lvl             1.9 mg/dL                             Glucose Lvl               95 mg/dL                             BUN                       13 mg/dL                             Creatinine                0.90 mg/dL                             eGFR-AA                   82 mL/min  LOW                             eGFR-ZAY                  67 mL/min  LOW                             Bili Total                0.4 mg/dL                             Bili Direct               <0.1 mg/dL                             Bili Indirect             >0.3 mg/dL                             AST                       27 unit/L                             ALT                       45 unit/L                             Alk Phos                  81 unit/L                             Total Protein             7.7 gm/dL                             Albumin Lvl               3.6 gm/dL                             Globulin                  4.10 gm/mL  HI                             A/G Ratio                 1 ratio             Reviewed labs: Consistent with previous results      Impression and Plan     1.  Squamous cell carcinoma of left lateral tongue, S/P partial glossectomy     Squamous cell carcinoma of right aryepiglottic fold  2.  Active drinker about 7 drinks per day 2 days a week    Currently encouraged to refrain from excess alcohol intake, especially while on chemotherapy  3. Decreased renal function  from baseline      Discussed importance of aggressive hydration, stop all NSAIDS/naproxen or medications that increase renal toxcity   Tramadol 50mg q4hr prn pain in place of naproxen  4. Dysuria      UA with C&S today  5. Cough   CXR today, no acute cardiopulmonary event    Plan:  Return for Day 43 of high dose Cisplatin on 8-23-17, pending renal function  RTC for provider visit in 2 weeks with CBC, CMP, magnesium  Instructed patient to return sooner with any problems or worsening symptoms.  All questions answered at this time.           Professional Services   E & M Assistant:  Established Office-or-Other-Outpatient visit 83858(Level 5)

## 2022-04-29 NOTE — PROGRESS NOTES
Patient:   Miley York            MRN: [id number]            FIN: [id number]               Age:   61 years     Sex:  Female     :  1955   Associated Diagnoses:   None   Author:   Olesya Lugo MD      ENT Progress Note    Patient has T3 supraglottic and T2 tongue squamous cell carcinoma with N2c neck disease. Due to the widespread lymphadenopathy (retropharyngeal, level II, level V) and multifocal primaries, strongly recommend PET scan for staging purposes. Discussed at [facility] E[facility] Tumor Board and recommendation for PET scan was confirmed.

## 2022-05-05 NOTE — HISTORICAL [facility] CERNER
This is a historical note converted from [employer]. Formatting and pictures may have been removed.  Please reference [employer] for original formatting and attached multimedia. Indication for Surgery  61yF with tongue and laryngeal lesions. Here for panendoscopy. Significant smoking and EtOH history.  Preoperative Diagnosis  Left posterolateral tongue lesion  Right arytenoid/aryepiglottic fold lesion  Postoperative Diagnosis  Left posterolateral tongue lesion  Right arytenoid/aryepiglottic fold lesion  Operation  1. Direct Laryngoscopy with biopsy  2. Flexible Bronchoscopy  3. Esophagoscopy  Surgeon(s)  Staff: Dayron Amaral MD  Resident: Erin Gramajo MD  Assistant  none  Anesthesia  general  Estimated Blood Loss  5-ml  Urine Output  see anesthesia charting  Findings  Ulcerative left posterolateral tongue lesion. It was firm but without extension to midline, base of tongue, floor of mouth, or retromolar trigone. Ulcerative right arytenoid lesion with extension to posterior right aryepiglottic fold. No abnormality of epiglottis. No involvement of pyriform sinus, post-cricoid region, false vocal fold, or true vocal fold. No subglottic extension. Bronchoscopy and esophagoscopy were within normal limits.  Specimen(s)  Left lateral tongue lesion  Right arytenoid lesion  Complications  none  Technique  After informed consent was obtained and verified, the patient was brought to the operating suite and placed on the table in supine position. After adequate general anesthesia was obtained, time out was performed. Of note, patient had some bleeding with intubation, as the supraglottic lesion was friable. Patient was prepped and draped in clean fashion. Head of bed was rotated 90 degrees. Mouth guard was placed to protect upper alveolus. The Dedo laryngoscope was used to examine the upper aerodigestive tract. Oropharynx appeared within normal limits. The scope was passed deeper to view the right piriform sinus, with no masses or  lesions noted. The post-cricoid area was then visualized and appeared normal as well. The left piriform sinus was examined and no masses or lesions were visualized. The false and true vocal cords were examined and no masses or lesions were visualized. The epiglottis and tongue base were examined, with no masses or lesions noted. The scope was maneuvered to visualize the right arytenoid and aryepiglottic fold. There was a very firm, ulcerated and friable mass of the right arytenoid with minimal extension to the posterior right aryepiglottic fold.?Photodocumentation was obtained of the above findings. Biopsies were obtained with cups forceps and hemostasis was ascertained with?afrin-soaked pledgets. No further identifiable bleeding.?Flexible bronchoscopy was performed while the Dedo was suspended on a Mustard stand. Visualization was obtained to the level of segmental bronchi bilaterally. No lesions were noted but copious thick clear mucus was suctioned out of all bronchi. Attention was turned to flexible esophagoscopy. The flexible esophagoscope was passed into oral cavity to the esophagus and then into stomach. The rugal folds were examined to the level of the pylorus and the scope was retroflexed to examine the fundus. No masses or lesions were noted in the stomach or GE junction. The esophagus was examined with 360 degree visualization as the scope was retracted in the usual fashion. Attention was then turned to the oral cavity. A cheek retractor was placed and a 2-0 silk was placed through the tongue to aid in retraction. A large ulcerated lesion of the posterolateral left tongue was visualized. It was firm but without extension to midline, base of tongue, floor of mouth, or retromolar trigone. Biopsies were then obtained with cups forceps and hemostasis ascertained with afrin-soaked pledgets. 3-0 vicryl sutures were used for closure and hemostasis. No further identifiable bleeding at the termination of  procedure. The tooth guard was removed and dentition was inspected and noted to be stable. All instruments were removed from the patient, all counts were correct, and the patient was returned to the care of anesthesia for reversal in the operating room and sent to recovery room in satisfactory condition.   Statement Selected

## 2022-05-05 NOTE — HISTORICAL [facility] CERNER
This is a historical note converted from [employer]. Formatting and pictures may have been removed.  Please reference [employer] for original formatting and attached multimedia. 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 She presents today for surveillance EGD and denies any new weight loss, fatigue, worsening dysphagia from baseline or coughing up blood. She tolerates thickened foods and sips of liquids only and supplements her diet with feedings through her PEG tube. She only complains of a recent fall ~1 month ago which resulted in a fracture to her sternum and right ankle which were non op, but still cause her some discomfort.  Review of Systems  10 point ROS is negative if not otherwise stated in the HPI  Physical Exam  Vitals & Measurements  T:?36.2? ?C (Oral)? HR:?64(Peripheral)? RR:?19? BP:?104/69? SpO2:?92%? WT:?41.3?kg?  NAD, A&Ox3,  No inc WOB, stable on RA, sternum stable but tender  Abd soft, non tender, non distended, PEG tube in place mid epigastrum  Ext with palpable distal pulses, full ROM, 5/5 strength  Assessment/Plan  63F with h/o oroesophogeal SCC s/p excision, chemo and radiation here for surveillance EGD  ?  -risks and benefits discussed  ?  Claude Breanna PGY-2  General Surgery   Problem List/Past Medical History  Ongoing  Acute deep vein thrombosis (DVT) of right upper extremity  Acute disease or injury-related malnutrition  Cancer of tongue  Depression  ETOH abuse  History of tobacco abuse  Schizophrenia  Tobacco user  Tobacco user  Historical  Alcohol abuse  COPD - Chronic obstructive pulmonary disease  divdrtiSchizophrenia  Diverticulitis  Tobacco user  Tobacco user  Procedure/Surgical History  Esophagogastroduodenoscopy (07/27/2018)  PEG Change/Replace/Remove (07/27/2018)  Bleeder Control Gastrointestinal (06/06/2018)  Esophagogastroduodenoscopy  (2018)  Sclerotherapy (2018)  Biopsy Gastrointestinal (2018)  Esophagogastroduodenoscopy (2018)  Bronchoscopy, Diagnostic (2018)  Biopsy Gastrointestinal (Upper, Upper, Upper) (01/10/2018)  Endoscopic Ultrasonography (Upper, Upper, Upper) (01/10/2018)  Esophagogastroduodenoscopy (Upper, Upper, Upper) (01/10/2018)  Savory Dilation (Upper, Upper, Upper) (01/10/2018)  Biopsy Gastrointestinal (12/15/2017)  Esophagogastroduodenoscopy (12/15/2017)  Dental Extraction (2017)  dental extractions (2017)  Gastrostomy (None) (2017)  Glossectomy Partial (Left) (2017)  Laryngoscopy (None) (2017)  Skin Graft Split Thickness (None) (2017)  Biopsy (ENT) (None) (05/15/2017)  Bronchoscopy, Diagnostic (None) (05/15/2017)  Esophagoscopy (None) (05/15/2017)  Laryngoscopy (None) (05/15/2017)  Panendoscopy (None) (05/15/2017)  Hernia Repair Incisional Laparoscopic (2016)   section  colon resection  Hemorrhoidectomy  Tonsillectomy   Medications  Inpatient  buffered lidocaine 2% - 0.5 ml syringe, 10 mg= 0.5 mL, Subcutaneous, As Directed  dexamethasone (for IVPB)  dexamethasone (for IVPB)  dexamethasone (for IVPB)  Heparin Flush 100 U/mL - 5 mL, 500 units= 5 mL, IV Push, Once-chemo  Heparin Flush 100 U/mL - 5 mL, 500 units= 5 mL, IV Push, Once-chemo  Heparin Flush 100 U/mL - 5 mL, 500 units= 5 mL, IV Push, Once-chemo  Heparin Flush 100 U/mL - 5 mL, 500 units= 5 mL, IV Push, Once-chemo  IVF Lactated Ringers LR Infusion 1,000 mL, 1000 mL, IV  Home  baclofen 5 mg oral tablet, 5 mg= 1 tab(s), Oral, TID, PRN, 6 refills  Benadryl 25 mg oral capsule, 25 mg, PEG Tube, At Bedtime  CHLORHEXIDINE 0.12% RINSE  Eliquis 5 mg oral tablet, 5 mg= 1 tab(s), Oral, BID, 6 refills  folic acid 1 mg oral tablet, 1 mg= 1 tab(s), PEG Tube, Daily  potassium chloride 20 mEq/15 mL oral liquid, 20 mEq= 15 mL, GTUBE, Daily, 11 refills  Prilosec 40 mg oral DR capsule, 40 mg= 1 cap(s), Oral,  Daily  PROCHLORPERAZINE 10 MG TAB, 10 mg= 1 tab(s), Oral  traZODone 100 mg oral tablet, 1 tab, PEG Tube, At Bedtime  Allergies  codeine  Social History  Alcohol - Low Risk, 2015  Current, Liquor, Daily, 1 drinks/episode average., 2019  Employment/School  Retired, Highest education level: Some college., 2016  Exercise  Exercise duration: 0., 2017  Home/Environment  Lives with Significant other. Living situation: Home/Independent. Home equipment: [employer]., 12/10/2018  Nutrition/Health  Type of diet: Jevity 1.2 per peg QID. Wants to lose weight: No. Sleeping concerns: No. Feels highly stressed: No., 2017  Other  Sexual  Substance Abuse - Denies Substance Abuse, 2015  Past, Marijuana, 2016  Tobacco  4 or less cigarettes(less than 1/4 pack)/day in last 30 days, Cigarettes, No, 2019  Family History  : Mother and Father.  Mental illness: Mother.  Immunizations  Vaccine Date Status Comments   influenza virus vaccine, inactivated 10/15/2018 Given    tetanus/diphtheria/pertussis, acel(Tdap) 2016 Given    hepatitis A-hepatitis B vaccine 2016 Given    hepatitis B adult vaccine 2015 Given    hepatitis A adult vaccine 2015 Given    hepatitis A-hepatitis B vaccine 10/13/2015 Given    influenza virus vaccine, inactivated 10/13/2015 Given Other : ?med unable to scan   hepatitis A-hepatitis B vaccine - Not Given Expectation Not Necessary  duiplicate order already given   pneumococcal 23-polyvalent vaccine 2015 Given        History of poorly diff SCCA of the tongue, epiglottic fold s/p partial glossectomy and adjuvant CRT.? 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 She modifies her diet.? Has some dysphagia.? Has PEG tube which she uses for supplementation. Her weight has been stable.? Complications regarding osteonecrosis of the jaw.? Recent PET CT reassuring.

## 2022-05-05 NOTE — HISTORICAL [facility] CERNER
This is a historical note converted from Cerdimitri. Formatting and pictures may have been removed.  Please reference Cerdimitri for original formatting and attached multimedia. Chief Complaint  rtc cancer surveillance; c/o sore underneath chin that drains  History of Present Illness  61 year old female referred to the ENT clinic with complaints of oral cavity pain and left otalgia that have been going on since November. She has had multiple visits to the ED and her dentist, but reports continued problems. She has been on antibiotics and peridex along with topical benzocaine. Positive odynophagia and 20 lb weight loss over 5 months, unintentional.  She has about a 36?pack year?history and she drinks almost nightly. She denies other significant medical problems.  ?   05/23/2017: Presents for results of surgery. Saw GS this AM for?discussion of PEG tube. [facility] at [facility] tumor boards and recommend surgery for tongue and CRT for necks. Naproxen is working for pain. Concerned about addiction. History of alcohol abuse. Concerned about dysarthria post-op.  ?   6/21/17: Here following her inpatient admission for partial glossectomy. She has two primary tumors, one in the tongue and one in the supraglottic larynx with associated N2c disease. Tumor board recommendation was for surgical management of the tongue followed by CRT for the larynx and necks. She underwent partial gloss and STSG on 6/2/17. Final pathology showed a 3.2 cm SCCa with negative margins, no lymphovascular invasion, but perineural invasion present.  She will be seeing med-onc and rad-onc within the coming weeks.  ?   6/29/17: Doing well. Tongue improved. Voicing well. Seen by Gen Surg last week for gtube. Patient said they were to be seen again to change it out, but werent given an appointment. Seen by Rad/Onc-- starting treatment 7/10/17. Has been fitted with mask.  ?   8/8/17: Started XRT 7/10. Has had vigorous coughing over the last few days which has  resulted in gagging and vomiting. Had CXR today that was normal.?Otherwise no other issues.  ?   9/12/17: Completed CRT 8/22/17 (XRT 66 Gy/30 fr to larynx and clinically enlarged LAD (level II, R>L), with concomitant 3 weekly cisplatin over 43 days; also received XRT to left tongue (due to +PNI) and necks), no breaks. During CRT, had severe dysguesia, mild xerostomia, as well as neck radiodermatitis (improved since), oral mucositis?and hoarseness. Plan per Dr. Hassan ([facility]): Needs repeat CT scan or MRI scan neck in 4-8 weeks after rx, if improving, then surveillance, if persistence or progression, then she may need surgical resection with BND vs wait for whole body PET CT scan to be performed 12 weeks after CRT. He is deferring to us.  Is using G-tube for majority of nutrition. Has some pain in skin?around Gtube. Intaking PO, coughs?with thin liquids, doesnt cough with thicks and solids. Has MBSS schedueld next week.?  ?   10/10/17: patient states she had swallow study which showed aspiration- states that she does not cough or choke with PO intake. states that swallow was difficultfor her because they gave her lauren crackers and she could not chew them. currently mostly eating pureed foods with water. also taking 2 cans of jevity/day. Taking 180cc flushes of water 4 times/day. gained 2 pounds since last visit. no lumps or bumps or other symptoms.  ?   11/8/17: requested earlier appointment due to left sided facial swelling and jaw pain. did not get dental extractions as recommended prior to radiation. has gained some weight recently- baseline 110. c/o right sided otalgia which has started in the past several weeks and is associated with right sided throat pain.  no other complaints or changes.  ?  11/30/17: saw Dr. Singleton for her jaw pain; treating her for TMJ. weight stable. right sided neck pain has improved. PET scan done; here for results.  ?  12/14/17: Dr. Singleton performed root canal last week on left  mandibular teeth. drained purulence from abscess. patient taking po antibiotics and steroids and state that swelling is much improved. here today because shallow ulcer seen by Dr. Campo and Areli. here for evaluation. tender in that area.  has EGD scheduled with GI for tomorrow.  ?  12/21/17: finished antibiotics last week. EGD done 12/15 which showed 1.5cm lesion mid esophagus biopsy +SCC- patient not yet aware of results. states that her left face feels more swollen again and her tongue has felt more swollen since FOM biopsy last week.  ?  2/8/18: Returns in follow up. 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 Post treatment PET demonstrated some hypermetabolism in the esophagus and she underwent EGD with biopsy with findings of squamous cell carcinoma. Tumor board recommendation was to begin with CRT. She has seen Dr. Hassan as well as Dr. Bonilla but has not started treatment yet. Overall she is in good spirits and is eager to start treatment.  She also has had an audiogram done today to evaluate her hearing.  ?  2/27/18: Here today for evaluation of new left FOM mass/lesion noted by Dr. Bonilla. Patient states she has had intermittent swelling and pain of her neck and face as well but today it is improved. Has been taking benadryl for tongue swelling and felt a few days ago that even her throat felt tight. Speech worse with new tongue mass.  Currently undergoing CRT for esophageal cancer, has completed 3 chemotherapy infusions, 3 weeks into XRT.  ?  4/10/18: Doing well. Complete CRT for esophageal cancer. Scheduled to see GI and schedule egd. Doing well from a laryngeal standpoint. No dysphagia or odynophagia. Tolerating PO. No weight loss.  Oral cavity pain resolved.  ?  6/26/18: Here for follow up[facility]. No further hematemasis. Some black stools which have resolved. Edema improving.  ?  9/11/18:  Here today for follow up. Admitted again?to [facility] for GI bleed. No bleeding?since. Has issues with PEG tube leaking though since replacement. Using PEG for most of nutrition although tolerating some soft food by mouth. Breathing much better. On home O2 since discharge. No new lumps/bumps,?otalgia, voice change. Still with pain of left jaw. [1]  ?  11/8/18: Here for surveillance. Admitted from 07/24/2018 to 08/15/2018 for aspiration pneumonitis, respiratory failure, DVT of RUE, upper GI bleeding. Continues to have pain in left mandible (ORN with exposed bone). Also has some left jaw swelling and pain. Has intermittent hoarseness which doesnt persist. Was admitted for aspiration pneumonia. Denies subjective changes to swallowing, throat pain, persistent voice changes, or neck masses. [1]  ?  12/18/18: Here for surveillance f/u and reevaluation of ORN of left mandible. Feels good, improved compared to last?visit. Eating better, still?cant have solid or hard food.?Fell on 12/11 resulting in fracture of left humerus and foot. CT Neck 10/30/18 found new 0.5 cm lesion in right pulm upper lobe but CT Chest Dec 2018 obtained showing a consolidative area in the right mid lung posteriorly. PET/CT ordered by Dr. Hassan, awaiting approval. Cleared by IM for general anesthesia for debridement of left mandible.?We have not referred patient to hyperbarics due to new pulmonary lesion which could represent carcinoma vs inflammatory change.  ?  1/22/19: F/u surveillance for synchronous tongue/supraglottic SCCa, esophageal SCCa. Reports gaininng few lbs. C/o PO per L nare consistently for many months (?longer). Mild decrease in ORN discomfort, still does not want surgery for this- wants dentures. Had PET 12/2018 which showed questionable b/l?lower lobe hyperactivity but no H&N disease. Also underwent EGD with bx of esophageal ulcer 1/10/19- results non cancerous but limited by crush artifact. Still following with RadOnc. Endorses h/o  L chin lesion for months/years that started after mandible infection (had one on contralateral side that appeared at same time and since resolved) [1] [1]  ?  6/25/19: still having drainage from fistula. otherwise doing well.  Review of Systems  General: denies fatigue, unintentional weight loss, fever or chills  Eyes: denies vision changeEars: denies change in hearing, otorrhea, otalgia, tinnitus, or vertigo  Nose: denies rhinorrhea, nasal congestion, sneezing  Mouth: denies sore throat  Neck: denies new neck mass or lymphadenopathy  Respiratory: denies cough, dyspnea  Cardiovascular: denies chest pain  GI: denies dysphagia, or nausea  Skin: denies rash or changing skin lesion  Endocrine: denies heat or cold intolerance  Neurologic: denies headache, syncope, motor or sensory deficit  Psych: denies anxiety or depression  Physical Exam  Vitals & Measurements  T:?37? ?C (Oral)? HR:?91(Peripheral)? RR:?18? BP:?99/68?  HT:?152?cm? WT:?35.6?kg? BMI:?15.41?  Eye_EOMI, PERRLA  Ear Right TM normal, Left TM normal, normal pinnas and EACs  Nose - Inferior turbinates normal  Oral cavity - Tongue normal,Oropharynx - no lesions noted  Neck- no lymphadenopathy, no thyromegaly.  Respiratory- no increased work of breathing  Integumentary- no rashes, no skin lesions  Neurologic- CN II-XII intact  Assessment/Plan  63 yo female s/p left partial glossectomy for SCCa, laryngeal cancer s/p CRT 8/2017, esophageal cancer?s/p CRT 3/2018, bilateral SNHL. Now with ORN of left mandible with exposed bone.  - fistula with increased drainage, biopsy was negative. Finished course of antibiotics. Culture taken today. would like to do hyperbarics vs curettage. will touch base with Dr. Ovalles.  -RTC 4 weeks.   Problem List/Past Medical History  Ongoing  Acute deep vein thrombosis (DVT) of right upper extremity  Acute disease or injury-related malnutrition  Cancer of tongue  Depression  ETOH abuse  History of tobacco abuse  Schizophrenia  Tobacco  user  Tobacco user  Historical  Alcohol abuse  COPD - Chronic obstructive pulmonary disease  divdrtiSchizophrenia  Diverticulitis  Tobacco user  Tobacco user  Procedure/Surgical History  Biopsy Gastrointestinal (01/09/2019)  Esophagogastroduodenoscopy (01/09/2019)  Esophagogastroduodenoscopy, flexible, transoral; with biopsy, single or multiple (01/09/2019)  Excision of Esophagus, Via Natural or Artificial Opening Endoscopic, Diagnostic (01/09/2019)  Insertion of Infusion Device into Upper Vein, Percutaneous Approach (08/08/2018)  Change Feeding Device in Upper Intestinal Tract, External Approach (07/27/2018)  Esophagogastroduodenoscopy (07/27/2018)  Insertion of Infusion Device into Upper Vein, Percutaneous Approach (07/27/2018)  PEG Change/Replace/Remove (07/27/2018)  Bleeder Control Gastrointestinal (06/06/2018)  Esophagogastroduodenoscopy (06/06/2018)  Sclerotherapy (06/06/2018)  Control Bleeding in Gastrointestinal Tract, Via Natural or Artificial Opening Endoscopic (06/05/2018)  Insertion of Infusion Device into Superior Vena Cava, Percutaneous Approach (06/05/2018)  Transfusion of Nonautologous Red Blood Cells into Peripheral Vein, Percutaneous Approach (06/05/2018)  Ultrasonography of Superior Vena Cava, Guidance (06/05/2018)  Biopsy Gastrointestinal (05/09/2018)  Esophagogastroduodenoscopy (05/09/2018)  Esophagogastroduodenoscopy, flexible, transoral; with biopsy, single or multiple (05/09/2018)  Excision of Esophagus, Via Natural or Artificial Opening Endoscopic, Diagnostic (05/09/2018)  Excision of Stomach, Pylorus, Via Natural or Artificial Opening Endoscopic, Diagnostic (05/09/2018)  Change Feeding Device in Upper Intestinal Tract, External Approach (01/18/2018)  Change of gastrostomy tube, percutaneous, without imaging or endoscopic guidance (01/18/2018)  Bronchoscopy, Diagnostic (01/11/2018)  Bronchoscopy, rigid or flexible, including fluoroscopic guidance, when performed; diagnostic, with cell  washing, when performed (separate procedure) (01/11/2018)  Inspection of Tracheobronchial Tree, Via Natural or Artificial Opening Endoscopic (01/11/2018)  Biopsy Gastrointestinal (Upper, Upper, Upper) (01/10/2018)  Dilation of Esophagus, Via Natural or Artificial Opening Endoscopic (01/10/2018)  Endoscopic Ultrasonography (Upper, Upper, Upper) (01/10/2018)  Esophagogastroduodenoscopy (Upper, Upper, Upper) (01/10/2018)  Esophagogastroduodenoscopy, flexible, transoral; with biopsy, single or multiple (01/10/2018)  Esophagogastroduodenoscopy, flexible, transoral; with endoscopic ultrasound examination, including the esophagus, stomach, and either the duodenum or a surgically altered stomach where the jejunum is examined distal to the anastomosis (01/10/2018)  Esophagogastroduodenoscopy, flexible, transoral; with insertion of guide wire followed by passage of dilator(s) through esophagus over guide wire (01/10/2018)  Excision of Middle Esophagus, Via Natural or Artificial Opening Endoscopic, Diagnostic (01/10/2018)  Excision of Stomach, Via Natural or Artificial Opening Endoscopic, Diagnostic (01/10/2018)  Inspection of Upper Intestinal Tract, Via Natural or Artificial Opening Endoscopic (01/10/2018)  Savory Dilation (Upper, Upper, Upper) (01/10/2018)  Ultrasonography of Gastrointestinal Tract (01/10/2018)  Biopsy Gastrointestinal (12/15/2017)  Esophagogastroduodenoscopy (12/15/2017)  Esophagogastroduodenoscopy, flexible, transoral; with biopsy, single or multiple (12/15/2017)  Excision of Stomach, Pylorus, Via Natural or Artificial Opening Endoscopic, Diagnostic (12/15/2017)  Dental Extraction (06/02/2017)  dental extractions (06/02/2017)  Excision of Left Upper Leg Skin, External Approach (06/02/2017)  Excision of Tongue, Open Approach (06/02/2017)  Extraction of Lower Tooth, Multiple, External Approach (06/02/2017)  Extraction of Upper Tooth, Multiple, External Approach (06/02/2017)  Gastrostomy (None)  (2017)  Glossectomy Partial (Left) (2017)  Insertion of Feeding Device into Stomach, Open Approach (2017)  Inspection of Larynx, Via Natural or Artificial Opening Endoscopic (2017)  Laryngoscopy (None) (2017)  Replacement of Tongue with Autologous Tissue Substitute, External Approach (2017)  Skin Graft Split Thickness (None) (2017)  Biopsy (ENT) (None) (05/15/2017)  Bronchoscopy, Diagnostic (None) (05/15/2017)  Bronchoscopy, rigid or flexible, including fluoroscopic guidance, when performed; diagnostic, with cell washing, when performed (separate procedure) (05/15/2017)  Esophagoscopy (None) (05/15/2017)  Esophagoscopy, flexible, transoral; diagnostic, including collection of specimen(s) by brushing or washing, when performed (separate procedure) (05/15/2017)  Excision of Epiglottis, Via Natural or Artificial Opening Endoscopic, Diagnostic (05/15/2017)  Excision of Pharynx, Via Natural or Artificial Opening Endoscopic, Diagnostic (05/15/2017)  Inspection of Tracheobronchial Tree, Via Natural or Artificial Opening Endoscopic (05/15/2017)  Inspection of Upper Intestinal Tract, Via Natural or Artificial Opening Endoscopic (05/15/2017)  Laryngoscopy (None) (05/15/2017)  Laryngoscopy, direct, operative, with biopsy; (05/15/2017)  Panendoscopy (None) (05/15/2017)  Hernia Repair Incisional Laparoscopic (2016)  Laparoscopy, surgical, repair, incisional hernia (includes mesh insertion, when performed); reducible (2016)  Supplement Abdominal Wall with Synthetic Substitute, Percutaneous Endoscopic Approach (2016)   section  colon resection  Hemorrhoidectomy  Tonsillectomy   Medications  amoxicillin-clavulanate 875 mg-125 mg oral tablet, 1 tab(s), Oral, BID,? ?Not Taking, Completed Rx  baclofen 5 mg oral tablet, 5 mg= 1 tab(s), Oral, TID, PRN, 6 refills  Benadryl 25 mg oral capsule, 25 mg, PEG Tube, At Bedtime  CHLORHEXIDINE 0.12% RINSE  clonazePAM 0.5 mg oral  tablet, 0.5 mg= 1 tab(s), Oral, TID  Compazine 5 mg tab, 5 mg= 1 tab(s), Oral, At Bedtime  dexamethasone (for IVPB)  dexamethasone (for IVPB)  dexamethasone (for IVPB)  Eliquis 5 mg oral tablet, 5 mg= 1 tab(s), Oral, BID, 6 refills  folic acid 1 mg oral tablet, 1 mg= 1 tab(s), PEG Tube, Daily  Heparin Flush 100 U/mL - 5 mL, 500 units= 5 mL, IV Push, Once-chemo  Heparin Flush 100 U/mL - 5 mL, 500 units= 5 mL, IV Push, Once-chemo  Heparin Flush 100 U/mL - 5 mL, 500 units= 5 mL, IV Push, Once-chemo  Heparin Flush 100 U/mL - 5 mL, 500 units= 5 mL, IV Push, Once-chemo  levothyroxine 100 mcg (0.1 mg) oral tablet, 100 mcg= 1 tab(s), Oral, Daily  lidocaine 2% mucous membrane sol, PRN  nicotine 7 mg/24 hr transdermal film, extended release, 1 patch(es), TD, Daily,? ?Not taking: hasnt started yet  ondansetron 8 mg oral tablet, disintegrating, 8 mg= 1 tab(s), Oral, TID  potassium chloride 20 mEq/15 mL oral liquid, 20 mEq= 15 mL, GTUBE, Daily, 5 refills  Prilosec 40 mg oral DR capsule, 40 mg= 1 cap(s), Oral, Daily  prochlorperazine 10 mg oral tablet, 10 mg= 1 tab(s), Oral  traZODone 100 mg oral tablet, 1 tab, PEG Tube, At Bedtime  Allergies  codeine  Social History  Alcohol - Low Risk, 03/08/2015  Current, Liquor, Daily, 1 drinks/episode average., 01/09/2019  Employment/School  Retired, Highest education level: Some college., 01/26/2016  Exercise  Exercise duration: 0., 06/21/2017  Home/Environment  Lives with Significant other. Living situation: Home/Independent. Home equipment: [employer] Ordering moniter., 12/10/2018  Nutrition/Health  Type of diet: Jevity 1.2 per peg QID. Wants to lose weight: No. Sleeping concerns: No. Feels highly stressed: No., 06/21/2017  Other  Sexual  Sexual orientation: Straight or heterosexual. Gender Identity Identifies as female., 03/14/2019  Substance Use - Denies Substance Abuse, 03/08/2015  Past, Marijuana, Started age 17 Years. Stopped age 17 Years., 04/16/2019  Tobacco  5-9 cigarettes (between 1/4 to 1/2  pack)/day in last 30 days, No, Cigarettes, Ready to change: Yes. 10 per day., 2019  Family History  : Mother and Father.  Mental illness: Mother.  Immunizations  Vaccine Date Status Comments   influenza virus vaccine, inactivated 10/15/2018 Given    tetanus/diphtheria/pertussis, acel(Tdap) 2016 Given    hepatitis A-hepatitis B vaccine 2016 Given    hepatitis B adult vaccine 2015 Given    hepatitis A adult vaccine 2015 Given    hepatitis A-hepatitis B vaccine 10/13/2015 Given    influenza virus vaccine, inactivated 10/13/2015 Given Other : ?med unable to scan   hepatitis A-hepatitis B vaccine - Not Given Expectation Not Necessary  duiplicate order already given   pneumococcal 23-polyvalent vaccine 2015 Given    Health Maintenance  Health Maintenance  ???Pending?(in the next year)  ??? ??OverDue  ??? ? ? ?Diabetes Screening due??and every?  ??? ? ? ?Breast Cancer Screening due??18??and every 2??year(s)  ??? ? ? ?Alcohol Misuse Screening due??19??and every 1??year(s)  ??? ? ? ?Smoking Cessation due??19??and every 1??year(s)  ??? ??Due?  ??? ? ? ?Aspirin Therapy for CVD Prevention due??19??and every 1??year(s)  ??? ? ? ?COPD Maintenance-Spirometry due??19??and every?  ??? ? ? ?Lung Cancer Screening due??19??and every 1??year(s)  ??? ? ? ?Zoster Vaccine due??19??and every 100??year(s)  ??? ??Due In Future?  ??? ? ? ?Colorectal Screening not due until??10/23/19??and every 1??year(s)  ??? ? ? ?Obesity Screening not due until??20??and every 1??year(s)  ??? ? ? ?ADL Screening not due until??20??and every 1??year(s)  ??? ? ? ?Blood Pressure Screening not due until??20??and every 1??year(s)  ??? ? ? ?Body Mass Index Check not due until??20??and every 1??year(s)  ???Satisfied?(in the past 1 year)  ??? ??Satisfied?  ??? ? ? ?ADL Screening on??19.??Satisfied by Jordan TADEO, Martha BRAVO  ??? ? ? ?Blood Pressure  Screening on??06/25/19.??Satisfied by Ros Laughlin RN.  ??? ? ? ?Body Mass Index Check on??06/25/19.??Satisfied by Ros Laughlin RN.  ??? ? ? ?Colorectal Screening on??10/23/18.??Satisfied by Argelia Amaya  ??? ? ? ?Depression Screening on??04/16/19.??Satisfied by Deborah Ye LPN  ??? ? ? ?Diabetes Screening on??06/10/19.??Satisfied by Edward Mace Jr.  ??? ? ? ?Influenza Vaccine on??01/09/19.??Satisfied by Rozina Ward RN  ??? ? ? ?Lipid Screening on??04/15/19.??Satisfied by López Castro  ??? ? ? ?Obesity Screening on??06/25/19.??Satisfied by Ros Laughlin RN  ?     [1]?Office Visit Note; Irina Burt MD 04/25/2019 15:11 CDT   The patients history, past history and exam were discussed with the resident while the patient was in clinic and I agree with the proposed assessment and management plan.

## 2022-05-05 NOTE — HISTORICAL [facility] CERNER
This is a historical note converted from Cerdimitri. Formatting and pictures may have been removed.  Please reference Cerdimitri for original formatting and attached multimedia. History of Present Illness  ?  Problem list  1. 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  2. 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 ?Diagnosed 5/15/2017  3. 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  Treatment History:?  -Left partial glossectomy/split thickness skin graft with dental extractions and G-tube placement, 6/2/2017.?Pathology: poorly differentiated squamous cell carcinoma measuring 3.2 cm with close margins: anterior 3 mm and deep margin 5 mm. ?Additional margins were obtained and final assessment was 6 mm from the anterior and 6 mm from deep margins. ?Depth of invasion was 7 mm and there was perineural invasion.  -Definitive radiotherapy and chemotherapy with high-dose cisplatin started on 7/12/2017, completed 8-.  -Taxol 50 mg/m??and carboplatin AUC 2 IV weekly for 5 weeks (days 1, 8, 15, 22, and 29) concurrent with radiation. 2/12/18 - 3/12/18 (chemo), 3/26/18 (RT).  ?   Reason for visit:  Follow-up for?squamous cell carcinoma of the esophagus,?right epiglottic fold,?right lateral tongue  ?  History of present illness:  For details, please see my last note dated 02/07/2020.? Please also refer to assessment and plan section.  ??  Interval History?  ?  07/10/2020:  -02/13/2020: TTE: LVEF 70%  -07/07/2020: Surveillance CT C/A with contrast (comparison: 01/23/2020): No new or worsening sites of metastatic disease  -06/12/2020: Hemoglobin 9.8, progressively dropping (11.5 on 01/08/2020; 13.0 on 12/03/2019).? MCV 94.2, slightly elevated.? WBC, differential, and platelets normal  -GI attempted to call her to reschedule her EGD but, apparently, she was in a  nursing home after breaking her hip after a fall  -April 2020: [facility]: Left hip cephalo-medullary nail for intertrochanteric femur fracture  -07/10/2020: CBC and CMP reviewed.? Sodium 135, chronically low.? AST 38, minimally elevated.? Alk phos 219, chronically elevated.? Albumin 3.1.? Free T4 1.6, elevated.? TSH 0.083, suppressed.? Hemoglobin 11.4, stable.? Rest of CBC also unremarkable.  She is on levothyroxine 100 mcg daily.  Will inform her primary care provider; dose needs to be adjusted.  Patient interviewed via telephone visit. ?He was at home in [place], [place].? After her hip fracture in April, she spent a couple of months at a rehab facility.? Now, living by herself?in her home. ?Her daughter lives nearby?and occasionally checks on her.? She says that she is?ambulating with the help of a cane. ?Able to go up and down.? ECOG 1. ?No fevers or chills.? No trouble swallowing.? Denies any symptoms of?thyrotoxicosis.? Says that she will call ENT?to make a follow-up appointment soon.? 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 No throat pain?or odynophagia.??Denies any problems swallowing.? No otalgia. ?No lumps in the neck.? No dyspnea.  ?  This is a telephone visit note. ?Patient was treated using real-time audio, according to [facility] protocols. I, distant provider, conducted the visit from location identified below. The patient participated in the visit at a non-[facility] location selected by the patient identified below. I am licensed in the state where the patient stated they are located. The patient stated that they understood and accepted the privacy and security risks to their information at their location. Patient was located at her home in [place], [place].  ?   I, distant provider, was located at [facility].  ?   Telephone visit time spent with the patient?exceeds 23 minutes, over 50% of which was used for education and counseling regarding medical  conditions, current medications including risk/benefit and side effects/adverse events, over the counter medications-uses/doses, home self-care and contact precautions, and red flags and indications for immediate medical attention.? The patient is receptive, expresses understanding and is agreeable to plan. All questions answered.  ?  ??????  Review of Systems?  ?????12 point review of systems done in full with pertinent positives as described in interval history. Remainder of review of systems unremarkable.??  ???????  ?  Physical Examination:?  VITAL SIGNS: ?Reviewed. ? ?  GENERAL:? In no apparent distress.?  HEAD:? No signs of head trauma.  EYES:? Pupils are equal.? Extraocular motions intact.?  EARS:? Hearing grossly intact.  MOUTH:? Oropharynx is normal.  NECK:? No adenopathy, no JVD.??  CHEST:? Chest with clear breath sounds bilaterally.? No wheezes, rales, or rhonchi.?  CARDIAC:? Regular rate and rhythm.? S1 and S2, without murmurs, gallops, or rubs.  VASCULAR:? No Edema.? Peripheral pulses normal and equal in all extremities.  ABDOMEN:? Soft, without detectable tenderness.? No sign of distention.? No?? rebound or guarding, and no masses palpated.?? Bowel Sounds normal.  MUSCULOSKELETAL:? Good range of motion of all major joints. Extremities without clubbing, cyanosis or edema.?  NEUROLOGIC EXAM:? Alert and oriented x 3.? No focal sensory or strength deficits.?? Speech normal.? Follows commands.  PSYCHIATRIC:? Mood normal.  SKIN:? No rash or lesions.  12/11/2018: Presents in wheelchair?secondary to a fall; very mild tenderness over upper?chest wall; coarse breath sounds bilaterally.? PEG tube noted.  04/04/2019:?Kyphotic, as usual; in no acute discomfort.  07/10/2020: Not examined; it was a telephone visit.  ?  ?   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  ?   #T2 N0 M0, stage IIA?(on EUS),?well-differentiated squamous cell carcinoma of esophagus, diagnosed?12/2017?  (EGD? was performed?for evaluation of hypermetabolic activity?incidentally?noted on PET/CT 11/22/2017, performed for post?chemoradiation?restaging?of?tongue and supraglottic?cancer)  Status post?concurrent chemoradiation therapy?(weekly low dose?carboplatin/Taxol?(02/12/2018-03/26/2018)  Surveillance EGD?(01/09/2019):?No recurrence  No recurrence or metastasis?on CTs?C/A (07/12/2019)  No recurrence?on CT C/A?(01/23/2020)  -No recurrence?(CT C/A: 07/07/2020)  -Surveillance EGD was due 07/2019; delayed;?hip fracture requiring hospitalization April 2020  ?  ?   #History of symptomatic iron deficiency anemia  -INFeD x1 (02/12/2018)  -Feraheme x2 (04/2019)  -->Iron stores normalized; hemoglobin?dramatically normalized (9.8?in 04/2019 --> 14.5?in 06/2019)  -06/12/2020: Hemoglobin 9.8?(progressive drop over several months), MCV slightly elevated  -07/10/2020:?Hemoglobin spontaneously improved to 11.4  ?  ?  #Lymphedema of face  -02/07/2020:?Significantly improved  ?  ?  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  ?  Plan:  -Overdue for surveillance EGD (was due 07/2019)  -Status post chemoradiation therapy for head and neck cancer (left tongue, supraglottis) 08/23/2017  -Status post chemoradiation therapy for esophageal cancer 03/26/2018  -No recurrence or metastasis on CTs chest and abdomen 07/07/2020  -Follow-up with ENT for osteoradionecrosis of left mandible with exposed bone and  left submandibular orocutaneous fistula  -Continue surveillance for esophageal cancer  -For esophageal cancer, follow-up every 6-12 months between 03/2020-03/2023, then annually  -From 03/2020 onwards, imaging studies as clinically indicated  -EGD every 6 months 03/2020-03/2021, then as clinically indicated  -07/10/2020:?Free T4 elevated, TSH suppressed; on Synthroid 100 mcg daily; will inform her primary care provider?for dose adjustment  ?  Follow-up in 6 months (January 2021)?with CBC, CMP, serum iron, TIBC, and ferritin; earlier, if any new or progressive symptoms of concern in the interim.  Continue to follow-up with ENT.  ?  Above discussed at length with her. ?All questions answered. ?Discussed labs and scans. ?She understands and agrees with this plan.  ---------------------  ?  ?  -Completed chemoradiation therapy?for head and neck cancer?(left tongue; supraglottis)?on 08/23/2017  ?  -Completed chemoradiation therapy?for esophageal cancer on 03/26/2018  ?  No metastasis on surveillance CTs C/A?(surveillance for esophageal cancer)?as of 07/07/2020.  ?  Iron stores and hemoglobin normalized with Feraheme?in 04/2019.  Anemia has recurred as of?06/12/2020?(likely macrocytic)  ?  ?-Continue surveillance for head and neck cancers (left lateral tongue and right epiglottic fold)?as per plan outlined below. ?  -Continue close ENT follow-up.  -Has bilateral sensorineural hearing loss  -Osteoradionecrosis of left mandible with exposed bone?and left submandibular orocutaneous fistula?(hyperbarics planned)  ?  -Osteoradionecrosis noted on imaging studies?and on examination by ENT;?curettage of bone and mucosal advancement?was planned?ENT?but patient was unwilling for surgical procedure.  Hyperbarics planned by ENT.  ?  Continue surveillance for esophageal cancer?(see below).  ?  -Since surveillance EGD was done on 01/09/2019, therefore,?next one was due in July 2019  We have referred her to GI.  ?  Continue to?follow-up  with ENT.  ?  TSH and free T4 normal 01/08/2020  Next check in 6 months (July).  ?  ?  Surveillance: Esophageal cancer:  -Since no evidence of residual esophageal cancer is found on follow-up EGD and esophageal biopsy, therefore there are 2 options at this time; either, esophagectomy, or surveillance. Surveillance is category 2B recommendation. ?Although the majority (approximately 90%) of relapses occur within the first 2 years after completion of local therapy, potentially actionable relapses have been recognized sometimes more than 5 years after local therapy. ?Metachronous malignancy (a second cancer in the residual esophagus or in the case of squamous cell carcinoma, in a different organ) is also a consideration in long-term survivors. ?For stage II treated with definitive chemoradiation therapy, most relapses (95%) occur within 24 months; thus, surveillance for at least 24 months is recommended for stage II or stage III patients treated with bimodality therapy (definitive chemoradiation therapy). ?  ?  --> Since surveillance has been decided upon?for esophageal cancer,?therefore:  1. ?History and physical every 3-6 months for 1-2 years?(until 03/2020), then every 6-12 months for 3-5 years?(03/2020-03/2023), then annually;  2. ?Chemistry profile and CBC as clinically indicated;  3. ?Imaging studies as clinically indicated;  4. ?CT chest/abdomen with contrast unless contraindicated every 6 months for up to 2 years?(until 03/2020)?if the patient is likely to tolerate additional curative intent therapy for recurrence;  5. ?EGD every 3-6 months for the first 2 years?(until 03/2020), then every 6 months for the third year?(03/2020-03/2021), then as clinically indicated:  6. ?Dilatation for anastomotic stenosis?  Physical Exam  Vitals & Measurements  HT:?153?cm? WT:?38.1?kg? BMI:?16.28?   Problem List/Past Medical History  Ongoing  Acute deep vein thrombosis (DVT) of right upper extremity  Acute disease or  injury-related malnutrition  Allergic rhinitis  Cancer of tongue  Depression  Esophageal cancer  ETOH abuse  History of cancer of larynx  History of radiation therapy  History of tobacco abuse  History of tongue cancer  Laryngeal cancer  Neck muscle spasm  Obesity  Osteoradionecrosis, jaw  Schizophrenia  Tobacco user  Tobacco user  Historical  Alcohol abuse  COPD - Chronic obstructive pulmonary disease  divdrtiSchizophrenia  Diverticulitis  Pregnant  Pregnant  Tobacco user  Tobacco user  Procedure/Surgical History  Esophagogastroduodenoscopy, flexible, transoral; with directed placement of percutaneous gastrostomy tube (11/25/2019)  Insertion of Feeding Device into Stomach, Percutaneous Approach (11/25/2019)  Biopsy Gastrointestinal (01/09/2019)  Esophagogastroduodenoscopy (01/09/2019)  Esophagogastroduodenoscopy, flexible, transoral; with biopsy, single or multiple (01/09/2019)  Excision of Esophagus, Via Natural or Artificial Opening Endoscopic, Diagnostic (01/09/2019)  Insertion of Infusion Device into Upper Vein, Percutaneous Approach (08/08/2018)  Change Feeding Device in Upper Intestinal Tract, External Approach (07/27/2018)  Esophagogastroduodenoscopy (07/27/2018)  Insertion of Infusion Device into Upper Vein, Percutaneous Approach (07/27/2018)  PEG Change/Replace/Remove (07/27/2018)  Bleeder Control Gastrointestinal (06/06/2018)  Esophagogastroduodenoscopy (06/06/2018)  Sclerotherapy (06/06/2018)  Control Bleeding in Gastrointestinal Tract, Via Natural or Artificial Opening Endoscopic (06/05/2018)  Insertion of Infusion Device into Superior Vena Cava, Percutaneous Approach (06/05/2018)  Transfusion of Nonautologous Red Blood Cells into Peripheral Vein, Percutaneous Approach (06/05/2018)  Ultrasonography of Superior Vena Cava, Guidance (06/05/2018)  Biopsy Gastrointestinal (05/09/2018)  Esophagogastroduodenoscopy (05/09/2018)  Esophagogastroduodenoscopy, flexible, transoral; with biopsy, single or multiple  (05/09/2018)  Excision of Esophagus, Via Natural or Artificial Opening Endoscopic, Diagnostic (05/09/2018)  Excision of Stomach, Pylorus, Via Natural or Artificial Opening Endoscopic, Diagnostic (05/09/2018)  Change Feeding Device in Upper Intestinal Tract, External Approach (01/18/2018)  Change of gastrostomy tube, percutaneous, without imaging or endoscopic guidance (01/18/2018)  Bronchoscopy, Diagnostic (01/11/2018)  Bronchoscopy, rigid or flexible, including fluoroscopic guidance, when performed; diagnostic, with cell washing, when performed (separate procedure) (01/11/2018)  Inspection of Tracheobronchial Tree, Via Natural or Artificial Opening Endoscopic (01/11/2018)  Biopsy Gastrointestinal (Upper, Upper, Upper) (01/10/2018)  Dilation of Esophagus, Via Natural or Artificial Opening Endoscopic (01/10/2018)  Endoscopic Ultrasonography (Upper, Upper, Upper) (01/10/2018)  Esophagogastroduodenoscopy (Upper, Upper, Upper) (01/10/2018)  Esophagogastroduodenoscopy, flexible, transoral; with biopsy, single or multiple (01/10/2018)  Esophagogastroduodenoscopy, flexible, transoral; with endoscopic ultrasound examination, including the esophagus, stomach, and either the duodenum or a surgically altered stomach where the jejunum is examined distal to the anastomosis (01/10/2018)  Esophagogastroduodenoscopy, flexible, transoral; with insertion of guide wire followed by passage of dilator(s) through esophagus over guide wire (01/10/2018)  Excision of Middle Esophagus, Via Natural or Artificial Opening Endoscopic, Diagnostic (01/10/2018)  Excision of Stomach, Via Natural or Artificial Opening Endoscopic, Diagnostic (01/10/2018)  Inspection of Upper Intestinal Tract, Via Natural or Artificial Opening Endoscopic (01/10/2018)  Savory Dilation (Upper, Upper, Upper) (01/10/2018)  Ultrasonography of Gastrointestinal Tract (01/10/2018)  Biopsy Gastrointestinal (12/15/2017)  Esophagogastroduodenoscopy  (12/15/2017)  Esophagogastroduodenoscopy, flexible, transoral; with biopsy, single or multiple (12/15/2017)  Excision of Stomach, Pylorus, Via Natural or Artificial Opening Endoscopic, Diagnostic (12/15/2017)  Dental Extraction (06/02/2017)  dental extractions (06/02/2017)  Excision of Left Upper Leg Skin, External Approach (06/02/2017)  Excision of Tongue, Open Approach (06/02/2017)  Extraction of Lower Tooth, Multiple, External Approach (06/02/2017)  Extraction of Upper Tooth, Multiple, External Approach (06/02/2017)  Gastrostomy (None) (06/02/2017)  Glossectomy Partial (Left) (06/02/2017)  Insertion of Feeding Device into Stomach, Open Approach (06/02/2017)  Inspection of Larynx, Via Natural or Artificial Opening Endoscopic (06/02/2017)  Laryngoscopy (None) (06/02/2017)  Replacement of Tongue with Autologous Tissue Substitute, External Approach (06/02/2017)  Skin Graft Split Thickness (None) (06/02/2017)  Biopsy (ENT) (None) (05/15/2017)  Bronchoscopy, Diagnostic (None) (05/15/2017)  Bronchoscopy, rigid or flexible, including fluoroscopic guidance, when performed; diagnostic, with cell washing, when performed (separate procedure) (05/15/2017)  Esophagoscopy (None) (05/15/2017)  Esophagoscopy, flexible, transoral; diagnostic, including collection of specimen(s) by brushing or washing, when performed (separate procedure) (05/15/2017)  Excision of Epiglottis, Via Natural or Artificial Opening Endoscopic, Diagnostic (05/15/2017)  Excision of Pharynx, Via Natural or Artificial Opening Endoscopic, Diagnostic (05/15/2017)  Inspection of Tracheobronchial Tree, Via Natural or Artificial Opening Endoscopic (05/15/2017)  Inspection of Upper Intestinal Tract, Via Natural or Artificial Opening Endoscopic (05/15/2017)  Laryngoscopy (None) (05/15/2017)  Laryngoscopy, direct, operative, with biopsy; (05/15/2017)  Panendoscopy (None) (05/15/2017)  Hernia Repair Incisional Laparoscopic (03/02/2016)  Laparoscopy, surgical, repair,  incisional hernia (includes mesh insertion, when performed); reducible (2016)  Supplement Abdominal Wall with Synthetic Substitute, Percutaneous Endoscopic Approach (2016)   section  colon resection  Hemorrhoidectomy  Tonsillectomy   Medications  acetaminophen-hydrocodone 325 mg-5 mg oral tablet, 1 tab(s), Oral, q6hr  baclofen 5 mg oral tablet, 5 mg= 1 tab(s), Oral, TID, PRN, 6 refills  Benadryl 25 mg oral capsule, 25 mg, PEG Tube, At Bedtime  chlorhexidine 0.12% mucous membrane liquid, 0.018 gm= 15 mL, Oral, BID, 3 refills  clindamycin 1% topical solution, 1 aisha, TOP, BID  clindamycin 300 mg oral capsule, 300 mg= 1 cap(s), Oral, q6hr,? ?Not Taking, Completed Rx  clonazePAM 0.5 mg oral tablet, 0.5 mg= 1 tab(s), Oral, TID  Compazine 5 mg tab, 5 mg= 1 tab(s), Oral, At Bedtime  dexamethasone (for IVPB)  dexamethasone (for IVPB)  dexamethasone (for IVPB)  diphenhydrAMINE 12.5 mg/5 mL ORAL liquid, 12.5 mg= 5 mL, Oral, As Directed  Eliquis 5 mg oral tablet, 5 mg= 1 tab(s), Oral, BID, 6 refills  Flonase 50 mcg/inh nasal spray, 1 spray(s), Nasal, Daily, 4 refills  folic acid 1 mg oral tablet, 1 mg= 1 tab(s), PEG Tube, Daily  Heparin Flush 100 U/mL - 5 mL, 500 units= 5 mL, IV Push, Once-chemo  Heparin Flush 100 U/mL - 5 mL, 500 units= 5 mL, IV Push, Once-chemo  Heparin Flush 100 U/mL - 5 mL, 500 units= 5 mL, IV Push, Once-chemo  Heparin Flush 100 U/mL - 5 mL, 500 units= 5 mL, IV Push, Once-chemo  lactobacillus acidophilus oral tablet, chewable, 1 tab(s), Chewed, Daily,? ?Not taking  Lasix 20 mg oral tablet, See Instructions, 11 refills,? ?Not Taking per Prescriber  levothyroxine 100 mcg (0.1 mg) oral tablet, 100 mcg= 1 tab(s), Oral, Daily, 6 refills  lidocaine 2% mucous membrane sol, PRN  NeilMed Sinus Rinse Kit nasal powder for reconstitution, See Instructions,? ?Not taking  nicotine 7 mg/24 hr transdermal film, extended release, 1 patch(es), TD, Daily,? ?Not taking  potassium chloride 20 mEq/15 mL oral  liquid, 20 mEq= 15 mL, GTUBE, Daily, 5 refills  Prilosec 40 mg oral DR capsule, 40 mg= 1 cap(s), Oral, Daily  prochlorperazine 10 mg oral tablet, 10 mg= 1 tab(s), Oral,? ?Not taking  Rocephin 1 gm/D5W 50 mL IVPB (Premix), 1 gm, IV, q12hr,? ?Not Taking, Completed Rx  traZODone 100 mg oral tablet, 1 tab, PEG Tube, At Bedtime  Allergies  codeine  Social History  Abuse/Neglect  No, No, Yes, 2020  Alcohol - Low Risk, 2015  Current, Liquor, Daily, 2020  Employment/School  Retired, 2019  Exercise  Exercise type: denies., 2019  Home/Environment  Lives with Significant other. Living situation: Home/Independent. Oxygen, BP machine, 2019  Nutrition/Health  soft food, Good, 2019  Other  Sexual  Sexual orientation: Straight or heterosexual. Gender Identity Identifies as female., 2019  Substance Use - Denies Substance Abuse, 2015  Never, 2020  Tobacco  Former smoker, quit more than 30 days ago, N/A, 2020  5-9 cigarettes (between 1/4 to 1/2 pack)/day in last 30 days, Cigarettes, No, 2020  Family History  : Mother and Father.  Mental illness: Mother.  Immunizations  Vaccine Date Status Comments   zoster vaccine, inactivated 2020 Given    zoster vaccine, inactivated 2019 Given    influenza virus vaccine, inactivated 2019 Given    influenza virus vaccine, inactivated 10/15/2018 Given    influenza virus vaccine, inactivated 10/12/2017 Recorded    tetanus/diphtheria/pertussis, acel(Tdap) 2016 Given    influenza virus vaccine, inactivated 2016 Recorded    hepatitis A-hepatitis B vaccine 2016 Given    hepatitis B adult vaccine 2015 Given    hepatitis A adult vaccine 2015 Given    hepatitis A-hepatitis B vaccine 10/13/2015 Given    influenza virus vaccine, inactivated 10/13/2015 Given Other : ?med unable to scan   hepatitis A-hepatitis B vaccine - Not Given Expectation Not Necessary  duiplicate order already  given   pneumococcal 23-polyvalent vaccine 06/09/2015 Given    influenza virus vaccine, inactivated 11/12/2012 Recorded    influenza virus vaccine, inactivated 09/15/2011 Recorded    influenza virus vaccine, inactivated 10/29/2010 Recorded

## 2022-05-05 NOTE — HISTORICAL [facility] CERNER
This is a historical note converted from Carla. Formatting and pictures may have been removed.  Please reference Carla for original formatting and attached multimedia. Chief Complaint  laryngeal/tongue mass  History of Present Illness  61 year old female referred to the ENT clinic with complaints of oral cavity pain and left otalgia that have been going on since November. She has had multiple visits to the ED and her dentist, but reports continued problems. She has been on antibiotics and peridex along with topical benzocaine. Positive odynophagia and 20 lb weight loss over 5 months, unintentional.  She has about a 36?pack year?history and she drinks almost nightly. She denies other significant medical problems.  ?   5/15/17: No changes. Here for surgery.  Review of Systems  Constitutional: negative  Eye: no vision changes  ENMT: see HPI  Respiratory: negative  Cardiovascular: negative  Heme/Lymph: negative  Endocrine: negative  Immunologic: negative  Musculoskeletal: negative  Integumentary: negative  Neurologic: negative  All Other ROS: negative  Physical Exam  General: well nourished, well-developed, alert, oriented, NAD  Eyes: PEERLA, EOMI, normal conjunctiva  Ears: external normal, EAC normal, TM intact, no middle ear fluid  Nose: septum midline, no inferior turbinate hypertrophy, no polyps  OC/OP: dentition moderate, L posterolateral ulcerative tongue lesion  NP/HP/Larynx Indirect: attempted, limited view due to patient intolerance  Neck: soft, NT, no palpable LN, thyroid without nodules/goiter  Psychiatric: oriented to time/place/person, no depression/anxiety/agitation  Assessment/Plan  61yF with tongue lesion and AEF irregularity  - panendo today.   Problem List/Past Medical History  Depression  Depression  Schizophrenia  Supraglottic lesion  Tobacco user  Tobacco user  Tobacco user  Tobacco user  Historical  divdrtiSchizophrenia  Diverticulitis  Procedure/Surgical History  Hernia Repair Incisional  Laparoscopic (2016), Laparoscopy, surgical, repair, incisional hernia (includes mesh insertion, when performed); reducible (2016), Supplement Abdominal Wall with Synthetic Substitute, Percutaneous Endoscopic Approach (2016),  section, colon resection, Hemorrhoidectomy, Tonsillectomy.  Medications  Inpatient  No active inpatient medications  Home  alendronate 70 mg oral tablet, 70 mg, 1 tab(s), Oral, qWeek, 3 refills  B 50 Complex  Benadryl, 25 mg, Oral, Once a day (at bedtime)  Calcium 600+D  Fish Oil 1200 mg oral capsule  Flonase 50 mcg/inh nasal spray, 1 spray(s), Nasal, BID, 2 refills  MiraLax  multivitamin with minerals (Adult Tab)  Orabase 20% mucous membrane paste, 1 aisha, TOP, BID, PRN  Peridex 0.12% mucous membrane liquid, 0.018 gm, 15 mL, Oral, BID  potassium gluconate 100 mg oral tablet, extended release, 2 tab, Daily  PROCHLORPERAZINE 10 MG TAB, 5x/Day  Sodium Chloride Nose Drops/Spray ( Ocean 0.65% Sol.), 2 spray(s), Nasal, QID  TRAZODONE 100 MG TABLET  Vitamin D3  Allergies  codeine  Social History  Alcohol - Low Risk  Current, Wine, Liquor, Daily  Current, Wine, Daily  Employment/School  Retired, Highest education level: Some college.  Home/Environment  Lives with Significant other. Living situation: Home/Independent. Home equipment: Walker/Cane, BP moniter.  Other  Sexual  Substance Abuse - Denies Substance Abuse  Past, Marijuana  Tobacco  Current every day smoker, 30 per day.  Current every day smoker, Cigarettes, 20 per day. Started age 24.0 Years. Previous treatment: Nicotine replacement. Ready to change: Yes.  Former smoker, Cigarettes  Current every day smoker, Cigarettes  Family History  Mental illness: Mother.     [1]?Office Visit Note; Dave Rodriguez MD 2017 15:26 CDT